# Patient Record
Sex: MALE | Race: WHITE | NOT HISPANIC OR LATINO | Employment: FULL TIME | URBAN - METROPOLITAN AREA
[De-identification: names, ages, dates, MRNs, and addresses within clinical notes are randomized per-mention and may not be internally consistent; named-entity substitution may affect disease eponyms.]

---

## 2017-07-23 ENCOUNTER — NURSE TRIAGE (OUTPATIENT)
Dept: NURSING | Facility: CLINIC | Age: 32
End: 2017-07-23

## 2017-07-23 NOTE — TELEPHONE ENCOUNTER
"  Reason for Disposition    MODERATE rectal bleeding (small blood clots, passing blood without stool, or toilet water turns red)    Additional Information    Negative: Shock suspected (e.g., cold/pale/clammy skin, too weak to stand, low BP, rapid pulse)    Negative: Difficult to awaken or acting confused  (e.g., disoriented, slurred speech)    Negative: Passed out (i.e., lost consciousness, collapsed and was not responding)    Negative: [1] Vomiting AND [2] contains red blood or black (\"coffee ground\") material  (Exception: few red streaks in vomit that only happened once)    Negative: Sounds like a life-threatening emergency to the triager    Negative: Diarrhea is main symptom    Negative: Stool color other than brown or tan is main concern  (no bleeding and no melena)    Negative: SEVERE rectal bleeding (large blood clots; on and off, or constant bleeding)    Negative: SEVERE dizziness (e.g., unable to stand, requires support to walk, feels like passing out now)    Negative: [1] MODERATE rectal bleeding (small blood clots, passing blood without stool, or toilet water turns red) AND [2] more than once a day    Negative: Pale skin (pallor) of new onset or worsening    Negative: Tarry or jet black-colored stool (not dark green)    Negative: [1] Constant abdominal pain AND [2] present > 2 hours    Negative: Rectal foreign body (i.e., now or within past week;  inserted or swallowed)    Negative: High-risk adult (e.g., prior surgery on aorta, abdominal aortic aneurysm)    Negative: Taking Coumadin (warfarin) or other strong blood thinner, or known bleeding disorder (e.g., thrombocytopenia)    Negative: Known cirrhosis of the liver (or history of liver failure or ascites)    Negative: [1] Colonoscopy AND [2] in past 72 hours    Negative: Patient sounds very sick or weak to the triager    Protocols used: RECTAL BLEEDING-ADULT-AH  Patient states he has had blood in his stool X 2 this date; unable to determine amount.  " Transferred to scheduling.

## 2017-07-24 ENCOUNTER — OFFICE VISIT (OUTPATIENT)
Dept: FAMILY MEDICINE | Facility: CLINIC | Age: 32
End: 2017-07-24

## 2017-07-24 ENCOUNTER — TELEPHONE (OUTPATIENT)
Dept: FAMILY MEDICINE | Facility: CLINIC | Age: 32
End: 2017-07-24

## 2017-07-24 VITALS
SYSTOLIC BLOOD PRESSURE: 128 MMHG | TEMPERATURE: 98.3 F | BODY MASS INDEX: 26.51 KG/M2 | HEART RATE: 66 BPM | RESPIRATION RATE: 18 BRPM | DIASTOLIC BLOOD PRESSURE: 76 MMHG | HEIGHT: 73 IN | OXYGEN SATURATION: 100 % | WEIGHT: 200 LBS

## 2017-07-24 DIAGNOSIS — M25.531 PAIN IN BOTH WRISTS: ICD-10-CM

## 2017-07-24 DIAGNOSIS — K62.5 RECTAL BLEEDING: Primary | ICD-10-CM

## 2017-07-24 DIAGNOSIS — K64.9 HEMORRHOIDS, UNSPECIFIED HEMORRHOID TYPE: ICD-10-CM

## 2017-07-24 DIAGNOSIS — M25.532 PAIN IN BOTH WRISTS: ICD-10-CM

## 2017-07-24 LAB
% GRANULOCYTES: 64.8 %G (ref 40–75)
CRP SERPL-MCNC: <2.9 MG/L (ref 0–8)
ERYTHROCYTE [SEDIMENTATION RATE] IN BLOOD: 1 MM/HR (ref 0–20)
GRANULOCYTES #: 3.8 K/UL (ref 1.6–8.3)
HCT VFR BLD AUTO: 43.7 % (ref 40–53)
HEMOGLOBIN: 14 G/DL (ref 13.3–17.7)
LYMPHOCYTES # BLD AUTO: 1.8 K/UL (ref 0.8–5.3)
LYMPHOCYTES NFR BLD AUTO: 29.9 %L (ref 20–48)
MCH RBC QN AUTO: 31.5 PG (ref 26.5–35)
MCHC RBC AUTO-ENTMCNC: 32 G/DL (ref 32–36)
MCV RBC AUTO: 98.3 FL (ref 78–100)
MID #: 0.3 K/UL (ref 0–2.2)
MID %: 5.3 %M (ref 0–20)
PLATELET # BLD AUTO: 244 K/UL (ref 150–450)
RBC # BLD AUTO: 4.45 M/UL (ref 4.4–5.9)
WBC # BLD AUTO: 5.9 K/UL (ref 4–11)

## 2017-07-24 NOTE — MR AVS SNAPSHOT
After Visit Summary   7/24/2017    Donte Mixon    MRN: [id number]           Patient Information     Date Of Birth          1985        Visit Information        Provider Department      7/24/2017 11:20 AM Papo Ritter MD [facility]        Today's Diagnoses     Rectal bleeding    -  1    Hemorrhoids, unspecified hemorrhoid type        Pain in both wrists          Care Instructions    1. Rectal bleeding - likely due to internal hemorrhoids  Use proctofoam twice a day for one week  If you have a repeat significant bleed, we will do a colonoscopy    - ANOSCOPY  - CBC with Diff Plt ([facility])  - CRP inflammation  - Erythrocyte Sedimentation Rate (LabDAQ)    - hydrocortisone-pramoxine (PROCTOFOAM-HC) rectal foam; Place 1 applicator rectally 2 times daily  Dispense: 10 g; Refill: 1      2. Pain in both wrists  Probably due to ergonomics / overuse.  Trial of wrist splint at night on L wrist  Good ergonomics at work  If not improving in 1 month, will do xrays and have you see sports medicine    - EXTENSOR I WRIST EDMOND L LT            Follow-ups after your visit        Who to contact     Please call your clinic at [phone] to:    Ask questions about your health    Make or cancel appointments    Discuss your medicines    Learn about your test results    Speak to your doctor   If you have compliments or concerns about an experience at your clinic, or if you wish to file a complaint, please contact [facility] Patient Relations at [phone] or email us at [email or web address]         Additional Information About Your Visit        MyChart Information     [email or web address] gives you secure access to your electronic health record. If you see a primary care provider, you can also send messages to your care team and make appointments. If you have questions, please call your primary care clinic.  If you do not have a primary care provider, please call  "[phone] and they will assist you.      [employer] is an electronic gateway that provides easy, online access to your medical records. With [employer], you can request a clinic appointment, read your test results, renew a prescription or communicate with your care team.     To access your existing account, please contact your [facility] or call [phone] for assistance.        Care EveryWhere ID     This is your Care EveryWhere ID. This could be used by other organizations to access your [facility] medical records  [id number]        Your Vitals Were     Pulse Temperature Respirations Height Pulse Oximetry BMI (Body Mass Index)    66 98.3  F (36.8  C) (Oral) 18 6' 1\" (185.4 cm) 100% 26.39 kg/m2       Blood Pressure from Last 3 Encounters:   07/24/17 128/76   07/08/16 127/73   05/06/16 125/79    Weight from Last 3 Encounters:   07/24/17 200 lb (90.7 kg)   07/08/16 195 lb (88.5 kg)   05/06/16 195 lb (88.5 kg)              We Performed the Following     ANOSCOPY     CBC with Diff Plt ([facility])     CRP inflammation     Erythrocyte Sedimentation Rate (LabDAQ)     EXTENSOR I WRIST EDMOND L LT          Today's Medication Changes          These changes are accurate as of: 7/24/17  3:32 PM.  If you have any questions, ask your nurse or doctor.               Start taking these medicines.        Dose/Directions    hydrocortisone-pramoxine rectal foam   Commonly known as:  PROCTOFOAM-HC   Used for:  Hemorrhoids, unspecified hemorrhoid type   Started by:  Papo Ritter MD        Dose:  1 applicator   Place 1 applicator rectally 2 times daily   Quantity:  10 g   Refills:  1            Where to get your medicines      These medications were sent to [id number]  [address]  [address] & [place] [address] [place] [address]     Phone:  [phone]     hydrocortisone-pramoxine rectal foam                Primary Care Provider Office Phone # Fax # "    Papo Ritter [phone]       [facility] [address] [address]        Equal Access to Services     RASHAD FERRARI : Hadii aad ku hadnela Lopez, nehemias dashawnqana cristina, kennyta kaalmada kanika, comfort quijanoalexy medranorima caceres keisha vizcarra. So [facility] [phone].    ATENCIÓN: Si habla español, tiene a ferreira disposición servicios gratuitos de asistencia lingüística. Llame al [phone].    We comply with applicable federal civil rights laws and [place] laws. We do not discriminate on the basis of race, color, national origin, age, disability sex, sexual orientation or gender identity.            Thank you!     Thank you for choosing [facility]  for your care. Our goal is always to provide you with excellent care. Hearing back from our patients is one way we can continue to improve our services. Please take a few minutes to complete the written survey that you may receive in the mail after your visit with us. Thank you!             Your Updated Medication List - Protect others around you: Learn how to safely use, store and throw away your medicines at www.disposemymeds.org.          This list is accurate as of: 7/24/17  3:32 PM.  Always use your most recent med list.                   Brand Name Dispense Instructions for use Diagnosis    ALEVE PO      Take 220 mg by mouth 2 times daily as needed for moderate pain or other (Pain)        hydrocortisone-pramoxine rectal foam    PROCTOFOAM-HC    10 g    Place 1 applicator rectally 2 times daily    Hemorrhoids, unspecified hemorrhoid type       VITAMIN B 12 PO      Take by mouth daily        ZINC PO      Take by mouth daily

## 2017-07-24 NOTE — PROGRESS NOTES
"HPI  31 year old male here for evaluation of several issues.    1. Rectal bleed  Had large rectal bleed yesterday  Large clot photographed  No abd pain, rectal pain  No fever, chills, sweats, wt loss, nausea, vomiting, gas, food intolerance, chest pain, breathing issues  No h/o rectal bleed, hemorrhoids  + Fhx colon cancer, diverticulitis  - Fhx IBD    2. Hands/wrist pain  Has pain at lateral epicondyle for some time  Now with achiness in wrists L>R  Achiness in finger joints bilateally  No sharp stabbing pain in hands    Works on keyboard all day  Ergonomics poor per manager. Changed it recently.    3. H/o testiclar pain  Improved  Off meds        ROS  6 point ROS neg other than the symptoms noted above in the HPI.    MEDS  Current Outpatient Prescriptions   Medication     Naproxen Sodium (ALEVE PO)     alfuzosin (UROXATRAL) 10 MG 24 hr tablet     ciprofloxacin (CIPRO) 500 MG tablet     Cyanocobalamin (VITAMIN B 12 PO)     Multiple Vitamins-Minerals (ZINC PO)     No current facility-administered medications for this visit.          SOC      FHx  Family History   Problem Relation Age of Onset     Coronary Artery Disease Mother      Hypertension Mother      Hyperlipidemia Mother      CEREBROVASCULAR DISEASE Mother      Colon Cancer Maternal Grandmother      Other Cancer Paternal Grandmother      ? type     DIABETES Paternal Grandmother      GASTROINTESTINAL DISEASE Maternal Grandfather      diverticulitis       PHYSICAL EXAMINATION:  Vital signs: /76  Pulse 66  Temp 98.3  F (36.8  C) (Oral)  Resp 18  Ht 6' 1\" (185.4 cm)  Wt 200 lb (90.7 kg)  SpO2 100%  BMI 26.39 kg/m2    GENERAL:: healthy, alert and no distress  RESP: lungs clear to auscultation - no rales, no rhonchi, no wheezes  CV: regular rates and rhythm, normal S1 S2, no S3 or S4 and no murmur, no click or rub -  ABDOMEN: soft, no tenderness, no  hepatosplenomegaly, no masses, normal bowel sounds  ANOSCOPY: angry internal hemorrhoids at 4-5 " o'clock      LABS  Results for orders placed or performed in visit on 07/24/17   CBC with Diff Plt ([facility])   Result Value Ref Range    WBC 5.9 4.0 - 11.0 K/uL    Lymphocytes # 1.8 0.8 - 5.3 K/uL    % Lymphocytes 29.9 20.0 - 48.0 %L    Mid # 0.3 0.0 - 2.2 K/uL    Mid % 5.3 0.0 - 20.0 %M    GRANULOCYTES # 3.8 1.6 - 8.3 K/uL    % Granulocytes 64.8 40.0 - 75.0 %G    RBC 4.45 4.40 - 5.90 M/uL    Hemoglobin 14.0 13.3 - 17.7 g/dL    Hematocrit 43.7 40.0 - 53.0 %    MCV 98.3 78.0 - 100.0 fL    MCH 31.5 26.5 - 35.0 pg    MCHC 32.0 32.0 - 36.0 g/dL    Platelets 244.0 150.0 - 450.0 K/uL   CRP inflammation   Result Value Ref Range    CRP Inflammation <2.9 0.0 - 8.0 mg/L   Erythrocyte Sedimentation Rate (LabDAQ)   Result Value Ref Range    Sed Rate 1 0 - 20 mm/hr           ASSESSMENT/PLAN    1. Rectal bleeding  2. Hemorrhoids, unspecified hemorrhoid type  Significant rectal bleed yesterday.  Likely d/t angry internal hemorrhoids   No sxs or labs to suggest Inflammatory bowel disease, cancer, other.    Will treat as if hemorrhoids. If recurs, colonoscopy    - ANOSCOPY  - CBC with Diff Plt ([facility])  - CRP inflammation  - Erythrocyte Sedimentation Rate (LabDAQ)  - hydrocortisone-pramoxine (PROCTOFOAM-HC) rectal foam; Place 1 applicator rectally 2 times daily  Dispense: 10 g; Refill: 1    3. Pain in both wrists  Possible ergonomics of keyboarding / overuse  Trial of L wrist splint at night, good ergonomics during day.  Check CRP, ESR.   If no response, sports med in 1 month.    - EXTENSOR I WRIST EDMOND L LT    4. RTC 1 month  Check wrist/hands - if no improvement, sports med, xrays  Check rectal bleeding - if more bleeding, do colonoscopy      JOHN GOFF

## 2017-07-24 NOTE — PATIENT INSTRUCTIONS
1. Rectal bleeding - likely due to internal hemorrhoids  Use proctofoam twice a day for one week  If you have a repeat significant bleed, we will do a colonoscopy    - ANOSCOPY  - CBC with Diff Plt ([facility])  - CRP inflammation  - Erythrocyte Sedimentation Rate (LabDAQ)    - hydrocortisone-pramoxine (PROCTOFOAM-HC) rectal foam; Place 1 applicator rectally 2 times daily  Dispense: 10 g; Refill: 1      2. Pain in both wrists  Probably due to ergonomics / overuse.  Trial of wrist splint at night on L wrist  Good ergonomics at work  If not improving in 1 month, will do xrays and have you see sports medicine    - EXTENSOR I WRIST FELI CHAVEZ LT

## 2017-07-24 NOTE — TELEPHONE ENCOUNTER
The patient called back to advise that he answered no to family history of colon cancer, and in fact is maternal grandmother did have this disease.  He also wanted us to know that his maternal grandfather had diverticulitis.  Please make the update as necessary to the chart Health and history.

## 2017-07-24 NOTE — LETTER
July 24, 2017      Donte Mixon  [address] [address]        Dear Donte,    Thank you for getting your care at [facility]. Please see below for your test results.  In case you could not conjure up your MyChart password....    All the labs for the rectal bleeding and for arthritis were normal  Good news!  Thanks for the update on your family history      Resulted Orders   CBC with Diff Plt (\[facility]\"")   Result Value Ref Range    WBC 5.9 4.0 - 11.0 K/uL    Lymphocytes # 1.8 0.8 - 5.3 K/uL    % Lymphocytes 29.9 20.0 - 48.0 %L    Mid # 0.3 0.0 - 2.2 K/uL    Mid % 5.3 0.0 - 20.0 %M    GRANULOCYTES # 3.8 1.6 - 8.3 K/uL    % Granulocytes 64.8 40.0 - 75.0 %G    RBC 4.45 4.40 - 5.90 M/uL    Hemoglobin 14.0 13.3 - 17.7 g/dL    Hematocrit 43.7 40.0 - 53.0 %    MCV 98.3 78.0 - 100.0 fL    MCH 31.5 26.5 - 35.0 pg    MCHC 32.0 32.0 - 36.0 g/dL    Platelets 244.0 150.0 - 450.0 K/uL   CRP inflammation   Result Value Ref Range    CRP Inflammation <2.9 0.0 - 8.0 mg/L   Erythrocyte Sedimentation Rate (LabDAQ)   Result Value Ref Range    Sed Rate 1 0 - 20 mm/hr     Sincerely,    Papo Ritter MD

## 2017-07-25 ENCOUNTER — MYC MEDICAL ADVICE (OUTPATIENT)
Dept: FAMILY MEDICINE | Facility: CLINIC | Age: 32
End: 2017-07-25

## 2017-07-25 ENCOUNTER — TELEPHONE (OUTPATIENT)
Dept: FAMILY MEDICINE | Facility: CLINIC | Age: 32
End: 2017-07-25

## 2017-07-25 DIAGNOSIS — K64.9 HEMORRHOIDS, UNSPECIFIED HEMORRHOID TYPE: Primary | ICD-10-CM

## 2017-07-25 DIAGNOSIS — J34.2 DEVIATED NASAL SEPTUM: Primary | ICD-10-CM

## 2017-08-02 ENCOUNTER — TELEPHONE (OUTPATIENT)
Dept: FAMILY MEDICINE | Facility: CLINIC | Age: 32
End: 2017-08-02

## 2017-08-02 NOTE — TELEPHONE ENCOUNTER
PA started 08/02/2017 hydrocortisone (ANUSOL-HC) 2.5 % cream      covermymeds key: [id number]    [facility] pharmacy fax:[phone]    Olimpia Geller CMA

## 2017-09-25 ENCOUNTER — MYC MEDICAL ADVICE (OUTPATIENT)
Dept: UROLOGY | Facility: CLINIC | Age: 32
End: 2017-09-25

## 2017-09-25 ENCOUNTER — PRE VISIT (OUTPATIENT)
Dept: OTOLARYNGOLOGY | Facility: CLINIC | Age: 32
End: 2017-09-25

## 2017-09-25 NOTE — TELEPHONE ENCOUNTER
Stephen,     That's a great question. I saw a doctor years ago when living in [facility], though it wasn't septum specific, rather ENT. I don't remember the providers name though. Do you have any suggestions on how to obtain?     Thanks,     Donte

## 2017-09-25 NOTE — TELEPHONE ENCOUNTER
1.  Date/reason for appt: 9/29/17 - deviated nasal septum    2.  Referring provider: [facility] Dr. Ritter    3.  Call to patient (Yes / No - short description): yes, email    4.  Previous care at:    Aashish Platt MD ([place])

## 2017-09-26 NOTE — TELEPHONE ENCOUNTER
Dr. Aashish Platt - [phone]. Saw him in 2011.    Thanks,     Donte   _ _ _ _     Emailed IVA to patient.

## 2017-09-28 NOTE — TELEPHONE ENCOUNTER
Called Dr. Aashish Sanford's office.  who answered said I'd have to call back on Monday, there's no one in the office to release records until Monday.

## 2017-09-29 ENCOUNTER — OFFICE VISIT (OUTPATIENT)
Dept: OTOLARYNGOLOGY | Facility: CLINIC | Age: 32
End: 2017-09-29

## 2017-09-29 VITALS — WEIGHT: 200 LBS | BODY MASS INDEX: 26.51 KG/M2 | HEIGHT: 73 IN

## 2017-09-29 DIAGNOSIS — R09.A2 GLOBUS SENSATION: ICD-10-CM

## 2017-09-29 DIAGNOSIS — R09.81 NASAL CONGESTION: Primary | ICD-10-CM

## 2017-09-29 RX ORDER — FLUTICASONE PROPIONATE 50 MCG
2 SPRAY, SUSPENSION (ML) NASAL DAILY
Qty: 1 BOTTLE | Refills: 6 | Status: SHIPPED | OUTPATIENT
Start: 2017-09-29 | End: 2018-07-18

## 2017-09-29 ASSESSMENT — PAIN SCALES - GENERAL: PAINLEVEL: NO PAIN (0)

## 2017-09-29 NOTE — PROGRESS NOTES
Otolaryngology Adult Consultation    Patient: Dotne Mixon  : 1985      HPI:  Donte Mixon is a 31 year old male seen today in the Otolaryngology Clinic for devaited septum.  The patient reports that he has a difficult time breathing out of the left side of his nose.  The difficulty ranges from mild to severe at times.  He was seen by an ENT in [place] and was told that he had a deviated septum.  He denies any history of nasal trauma.  He does feel like his nostrils are asymmetric and that the left one is way smaller than the right one.  He has not been on any nasal sprays in terms of a nasal steroid spray.  He has used Afrin at times which does seem to help.  He denies any issues with sinusitis.     In addition, the patient reports in the last several weeks he feels like there has been a globus sensation in the back of his throat.  He feels it with swallows.  It is not painful.  He has never not been able to swallow food, but he feels like there is something that gets stuck back there.         Medications:  Current Outpatient Rx   Medication Sig Dispense Refill     fluticasone (FLONASE) 50 MCG/ACT spray Spray 2 sprays into both nostrils daily 1 Bottle 6     hydrocortisone (ANUSOL-HC) 2.5 % cream Place rectally 2 times daily 30 g 1     Naproxen Sodium (ALEVE PO) Take 220 mg by mouth 2 times daily as needed for moderate pain or other (Pain)       hydrocortisone-pramoxine (PROCTOFOAM-HC) rectal foam Place 1 applicator rectally 2 times daily 10 g 1     Cyanocobalamin (VITAMIN B 12 PO) Take by mouth daily       Multiple Vitamins-Minerals (ZINC PO) Take by mouth daily         Allergies: Sulfa drugs     PMH:  Past Medical History:   Diagnosis Date     Hoarseness     Off and on over the years.       PSH:  No past surgical history on file.    FH:  Family History   Problem Relation Age of Onset     Coronary Artery Disease Mother      Hypertension Mother      Hyperlipidemia Mother      CEREBROVASCULAR DISEASE Mother       Migraines Mother      Colon Cancer Maternal Grandmother      Other Cancer Paternal Grandmother      ? type     DIABETES Paternal Grandmother      GASTROINTESTINAL DISEASE Maternal Grandfather      diverticulitis        SH:  Social History   Substance Use Topics     Smoking status: Never Smoker     Smokeless tobacco: Never Used     Alcohol use 4.8 oz/week       Review of Systems   ENT ROS 9/26/2017   Ears, Nose, Throat Nasal congestion or drainage, Sore throat, Trouble swallowing, Hoarseness       Physical Exam:    GEN:  The patient is alert, oriented and in no acute distress.  HEAD:  Head, face scalp is grossly normal.  EARS:  Ears demonstrate normal canals, auricles and tympanic membranes.  NOSE: Generally, the patient does have a straight upper two-thirds of the nose.  The lower third does have a subtle curve to the right.  When looking at the base of his nose, the caudal end of the septum is flipped out on the left side.  Intranasally, the septum is a little bit more shifted over to the left versus the right.  He does have a septal spur on the right side.  He does have enlarged turbinates.  He did not have a significant response to Afrin, but the Afrin also did not seem to decongest his nose a significant amount.         ORAL:  Oral cavity shows healthy mucosa with out ulceration, masses or other lesions                involving the tongue, palate, buccal mucosa, floor of mouth or gingiva.               NECK:   Neck is without adenopathy, thyroid or salivary gland masses.  PUL: normal work of breathing; no stridor  CV: RRR; no peripheral edema  M/S: normal muscle tone     Laryngoscopy is performed to examine the upper airway for pathology, functional or anatomic abnormality.  After application of topical anesthetic/decongestant solution the flexible endoscope was passed into each nasal cavity separately.  Findings are as follows:   Nasal passages without abnormality.     Nasopharynx:  Clear, no lesions,  crusting or inflammation   Base of tongue, vallecula, epiglottis, aryepiglottic folds, false vocal folds without mucosal lesions, masses or anatomic abnormality.   Glottis with normal movement of vocal folds, normal mucosa and no lesions   Pyriform sinuses, post-cricoid area, and posterior pharyngeal wall without lesions    Assessment/Plan:  The patient presents with a deviated septum and difficulty breathing from the left side as well as a globus sensation.  I reassured the patient that I did not see any masses or lesions on his laryngoscopy today.  I think that the globus sensation is likely due to some tension or swelling.  He does have a little bit of postnasal drip present.  Therefore, I am going to start him on Flonase to help with the postnasal drip and to also see if that increases his left-sided nasal breathing.  I am also going to check with our facial plastic surgeon because he does have a degree of caudal show of his septum.  This is not something that I have a great deal of experience with correcting and therefore, I would like to see if this is something that she is comfortable dealing with.  In the meantime, I will have the patient come back and see me in 4-6 weeks.  If it is more appropriate for him to see our facial plastic surgeon, I will certainly call him and make that recommendation.     I spent a total of 35 minutes face-to-face with Donte Mixon during today's office visit.  Over 50% of this time was spent counseling the patient on and/or coordinating care as documented in my assessment and plan.

## 2017-09-29 NOTE — PATIENT INSTRUCTIONS
1.  You were seen in the ENT Clinic today by Dr. Simon.  If you have any questions or concerns after your appointment, please call [phone].  Press option #1 for scheduling related needs.  Press option #3 for Nurse advice.  2.  Plan is to return to clinic in 4-6 weeks for another assessment.  3. Please use Flonase as directed. This was sent to your local pharmacy.

## 2017-09-29 NOTE — NURSING NOTE
Chief Complaint   Patient presents with     Consult     deviated nasal septum, sore throat, post nasal drip     Moe Padilla LPN

## 2017-09-29 NOTE — LETTER
2017       RE: Donte Mixon  [address] [facility] [address]     Dear Colleague,    Thank you for referring your patient, Donte Mixon, to the [facility] EAR NOSE AND THROAT at [facility]. Please see a copy of my visit note below.    Otolaryngology Adult Consultation    Patient: Donte Mixon  : 1985      HPI:  Donte Mixon is a 31 year old male seen today in the Otolaryngology Clinic for devaited septum.  The patient reports that he has a difficult time breathing out of the left side of his nose.  The difficulty ranges from mild to severe at times.  He was seen by an ENT in [place] and was told that he had a deviated septum.  He denies any history of nasal trauma.  He does feel like his nostrils are asymmetric and that the left one is way smaller than the right one.  He has not been on any nasal sprays in terms of a nasal steroid spray.  He has used Afrin at times which does seem to help.  He denies any issues with sinusitis.     In addition, the patient reports in the last several weeks he feels like there has been a globus sensation in the back of his throat.  He feels it with swallows.  It is not painful.  He has never not been able to swallow food, but he feels like there is something that gets stuck back there.         Medications:  Current Outpatient Rx   Medication Sig Dispense Refill     fluticasone (FLONASE) 50 MCG/ACT spray Spray 2 sprays into both nostrils daily 1 Bottle 6     hydrocortisone (ANUSOL-HC) 2.5 % cream Place rectally 2 times daily 30 g 1     Naproxen Sodium (ALEVE PO) Take 220 mg by mouth 2 times daily as needed for moderate pain or other (Pain)       hydrocortisone-pramoxine (PROCTOFOAM-HC) rectal foam Place 1 applicator rectally 2 times daily 10 g 1     Cyanocobalamin (VITAMIN B 12 PO) Take by mouth daily       Multiple Vitamins-Minerals (ZINC PO) Take by mouth daily         Allergies: Sulfa drugs     PMH:  Past Medical History:    Diagnosis Date     Hoarseness     Off and on over the years.       PSH:  No past surgical history on file.    FH:  Family History   Problem Relation Age of Onset     Coronary Artery Disease Mother      Hypertension Mother      Hyperlipidemia Mother      CEREBROVASCULAR DISEASE Mother      Migraines Mother      Colon Cancer Maternal Grandmother      Other Cancer Paternal Grandmother      ? type     DIABETES Paternal Grandmother      GASTROINTESTINAL DISEASE Maternal Grandfather      diverticulitis        SH:  Social History   Substance Use Topics     Smoking status: Never Smoker     Smokeless tobacco: Never Used     Alcohol use 4.8 oz/week       Review of Systems   ENT ROS 9/26/2017   Ears, Nose, Throat Nasal congestion or drainage, Sore throat, Trouble swallowing, Hoarseness       Physical Exam:    GEN:  The patient is alert, oriented and in no acute distress.  HEAD:  Head, face scalp is grossly normal.  EARS:  Ears demonstrate normal canals, auricles and tympanic membranes.  NOSE: Generally, the patient does have a straight upper two-thirds of the nose.  The lower third does have a subtle curve to the right.  When looking at the base of his nose, the caudal end of the septum is flipped out on the left side.  Intranasally, the septum is a little bit more shifted over to the left versus the right.  He does have a septal spur on the right side.  He does have enlarged turbinates.  He did not have a significant response to Afrin, but the Afrin also did not seem to decongest his nose a significant amount.         ORAL:  Oral cavity shows healthy mucosa with out ulceration, masses or other lesions                involving the tongue, palate, buccal mucosa, floor of mouth or gingiva.               NECK:   Neck is without adenopathy, thyroid or salivary gland masses.  PUL: normal work of breathing; no stridor  CV: RRR; no peripheral edema  M/S: normal muscle tone     Laryngoscopy is performed to examine the upper airway  for pathology, functional or anatomic abnormality.  After application of topical anesthetic/decongestant solution the flexible endoscope was passed into each nasal cavity separately.  Findings are as follows:   Nasal passages without abnormality.     Nasopharynx:  Clear, no lesions, crusting or inflammation   Base of tongue, vallecula, epiglottis, aryepiglottic folds, false vocal folds without mucosal lesions, masses or anatomic abnormality.   Glottis with normal movement of vocal folds, normal mucosa and no lesions   Pyriform sinuses, post-cricoid area, and posterior pharyngeal wall without lesions    Assessment/Plan:  The patient presents with a deviated septum and difficulty breathing from the left side as well as a globus sensation.  I reassured the patient that I did not see any masses or lesions on his laryngoscopy today.  I think that the globus sensation is likely due to some tension or swelling.  He does have a little bit of postnasal drip present.  Therefore, I am going to start him on Flonase to help with the postnasal drip and to also see if that increases his left-sided nasal breathing.  I am also going to check with our facial plastic surgeon because he does have a degree of caudal show of his septum.  This is not something that I have a great deal of experience with correcting and therefore, I would like to see if this is something that she is comfortable dealing with.  In the meantime, I will have the patient come back and see me in 4-6 weeks.  If it is more appropriate for him to see our facial plastic surgeon, I will certainly call him and make that recommendation.     I spent a total of 35 minutes face-to-face with Donte Mixon during today's office visit.  Over 50% of this time was spent counseling the patient on and/or coordinating care as documented in my assessment and plan.        Again, thank you for allowing me to participate in the care of your patient.      Sincerely,    Ramya Simon,  MD

## 2017-09-29 NOTE — MR AVS SNAPSHOT
After Visit Summary   9/29/2017    Donte Mixon    MRN: [id number]           Patient Information     Date Of Birth          1985        Visit Information        Provider Department      9/29/2017 2:00 PM Ramya Simon MD M [facility] Ear Nose and Throat        Today's Diagnoses     Nasal congestion    -  1    Globus sensation          Care Instructions    1.  You were seen in the ENT Clinic today by Dr. Simon.  If you have any questions or concerns after your appointment, please call [phone].  Press option #1 for scheduling related needs.  Press option #3 for Nurse advice.  2.  Plan is to return to clinic in 4-6 weeks for another assessment.  3. Please use Flonase as directed. This was sent to your local pharmacy.              Follow-ups after your visit        Your next 10 appointments already scheduled     Nov 02, 2017  9:45 AM CDT   (Arrive by 9:30 AM)   Return Visit with MD MIRIAM Dahl [facility] Ear Nose and Throat ([facility])    [address] [place] [address] [phone]              Who to contact     Please call your clinic at [phone] to:    Ask questions about your health    Make or cancel appointments    Discuss your medicines    Learn about your test results    Speak to your doctor   If you have compliments or concerns about an experience at your clinic, or if you wish to file a complaint, please contact [facility] Patient Relations at [phone] or email us at [email or web address]         Additional Information About Your Visit        MyChart Information     [employer] gives you secure access to your electronic health record. If you see a primary care provider, you can also send messages to your care team and make appointments. If you have questions, please call your primary care clinic.  If you do not have a primary care provider, please call [phone] and they  "will assist you.      [employer] is an electronic gateway that provides easy, online access to your medical records. With [employer], you can request a clinic appointment, read your test results, renew a prescription or communicate with your care team.     To access your existing account, please contact your [facility] or call [phone] for assistance.        Care EveryWhere ID     This is your Care EveryWhere ID. This could be used by other organizations to access your [place] medical records  [id number]        Your Vitals Were     Height BMI (Body Mass Index)                1.86 m (6' 1.23\") 26.22 kg/m2           Blood Pressure from Last 3 Encounters:   07/24/17 128/76   07/08/16 127/73   05/06/16 125/79    Weight from Last 3 Encounters:   09/29/17 90.7 kg (200 lb)   07/24/17 90.7 kg (200 lb)   07/08/16 88.5 kg (195 lb)              We Performed the Following     LARYNGOSCOPY FLEX FIBEROPTIC, DIAGNOSTIC          Today's Medication Changes          These changes are accurate as of: 9/29/17 11:59 PM.  If you have any questions, ask your nurse or doctor.               Start taking these medicines.        Dose/Directions    fluticasone 50 MCG/ACT spray   Commonly known as:  FLONASE   Used for:  Nasal congestion   Started by:  Ramya Simon MD        Dose:  2 spray   Spray 2 sprays into both nostrils daily   Quantity:  1 Bottle   Refills:  6            Where to get your medicines      These medications were sent to [facility] [address] [place] [address] [address] [place] [address] [place] [address]     Phone:  [phone]     fluticasone 50 MCG/ACT spray                Primary Care Provider Office Phone # Fax #    Papo Ritter [phone]       [address] [address]        Equal Access to Services     RASHAD FERRARI AH: Hadii delaney Lopez, waaxda luqadago, qaybta kaalmada " comfort boudreauxrima whippleaan ah. So [facility] [phone].    ATENCIÓN: Si habla heriberto, tiene a ferreira disposición servicios gratuitos de asistencia lingüística. Madeline al [phone].    We comply with applicable federal civil rights laws and [place] laws. We do not discriminate on the basis of race, color, national origin, age, disability, sex, sexual orientation, or gender identity.            Thank you!     Thank you for choosing [facility] EAR NOSE AND THROAT  for your care. Our goal is always to provide you with excellent care. Hearing back from our patients is one way we can continue to improve our services. Please take a few minutes to complete the written survey that you may receive in the mail after your visit with us. Thank you!             Your Updated Medication List - Protect others around you: Learn how to safely use, store and throw away your medicines at www.disposemymeds.org.          This list is accurate as of: 9/29/17 11:59 PM.  Always use your most recent med list.                   Brand Name Dispense Instructions for use Diagnosis    ALEVE PO      Take 220 mg by mouth 2 times daily as needed for moderate pain or other (Pain)        fluticasone 50 MCG/ACT spray    FLONASE    1 Bottle    Spray 2 sprays into both nostrils daily    Nasal congestion       hydrocortisone 2.5 % cream    ANUSOL-HC    30 g    Place rectally 2 times daily    Hemorrhoids, unspecified hemorrhoid type       hydrocortisone-pramoxine rectal foam    PROCTOFOAM-HC    10 g    Place 1 applicator rectally 2 times daily    Hemorrhoids, unspecified hemorrhoid type       VITAMIN B 12 PO      Take by mouth daily        ZINC PO      Take by mouth daily

## 2017-12-07 NOTE — TELEPHONE ENCOUNTER
APPT INFO    Date /Time: 12/20/17 at 4PM   Reason for Appt: Septal Deviation   Ref Provider/Clinic: Ramya Simon   Are there internal records? If yes, list: [facility]   Patient Contact (Y/N) & Call Details: No, referred   Action:

## 2017-12-20 ENCOUNTER — PRE VISIT (OUTPATIENT)
Dept: OTOLARYNGOLOGY | Facility: CLINIC | Age: 32
End: 2017-12-20

## 2018-03-15 ENCOUNTER — NURSE TRIAGE (OUTPATIENT)
Dept: NURSING | Facility: CLINIC | Age: 33
End: 2018-03-15

## 2018-03-15 ENCOUNTER — OFFICE VISIT (OUTPATIENT)
Dept: FAMILY MEDICINE | Facility: CLINIC | Age: 33
End: 2018-03-15
Payer: COMMERCIAL

## 2018-03-15 VITALS
BODY MASS INDEX: 28 KG/M2 | HEIGHT: 73 IN | HEART RATE: 65 BPM | WEIGHT: 211.3 LBS | DIASTOLIC BLOOD PRESSURE: 75 MMHG | OXYGEN SATURATION: 97 % | TEMPERATURE: 97.6 F | SYSTOLIC BLOOD PRESSURE: 116 MMHG

## 2018-03-15 DIAGNOSIS — J01.91 ACUTE RECURRENT SINUSITIS, UNSPECIFIED LOCATION: ICD-10-CM

## 2018-03-15 DIAGNOSIS — H65.192 OTHER ACUTE NONSUPPURATIVE OTITIS MEDIA OF LEFT EAR, RECURRENCE NOT SPECIFIED: ICD-10-CM

## 2018-03-15 DIAGNOSIS — H92.02 LEFT EAR PAIN: Primary | ICD-10-CM

## 2018-03-15 DIAGNOSIS — J34.2 DEVIATED NASAL SEPTUM: ICD-10-CM

## 2018-03-15 PROCEDURE — 99213 OFFICE O/P EST LOW 20 MIN: CPT | Performed by: INTERNAL MEDICINE

## 2018-03-15 RX ORDER — METHYLPREDNISOLONE 4 MG
1 TABLET, DOSE PACK ORAL DAILY
Refills: 0 | COMMUNITY
Start: 2018-03-13 | End: 2018-05-07

## 2018-03-15 RX ORDER — FLUTICASONE PROPIONATE 50 MCG
2 SPRAY, SUSPENSION (ML) NASAL DAILY
Refills: 6 | COMMUNITY
Start: 2018-03-10 | End: 2018-10-01

## 2018-03-15 RX ORDER — AZITHROMYCIN 250 MG/1
TABLET, FILM COATED ORAL
Qty: 6 TABLET | Refills: 1 | Status: SHIPPED | OUTPATIENT
Start: 2018-03-15 | End: 2018-05-07

## 2018-03-15 RX ORDER — PREDNISONE 20 MG/1
20 TABLET ORAL 2 TIMES DAILY
Qty: 10 TABLET | Refills: 1 | Status: SHIPPED | OUTPATIENT
Start: 2018-03-15 | End: 2018-05-07

## 2018-03-15 NOTE — NURSING NOTE
"Chief Complaint   Patient presents with     Follow Up For     left ear pain     Medication Problem       Face is itching. Patient is taking Prednisone 4mg (methoprednisone).       Initial /75 (BP Location: Left arm, Patient Position: Chair, Cuff Size: Adult Large)  Pulse 65  Temp 97.6  F (36.4  C) (Oral)  Ht 6' 1.25\" (1.861 m)  Wt 211 lb 4.8 oz (95.8 kg)  SpO2 97%  BMI 27.69 kg/m2 Estimated body mass index is 27.69 kg/(m^2) as calculated from the following:    Height as of this encounter: 6' 1.25\" (1.861 m).    Weight as of this encounter: 211 lb 4.8 oz (95.8 kg).  Medication Reconciliation: complete.  Juliana Rodriguez CMA    "

## 2018-03-15 NOTE — MR AVS SNAPSHOT
After Visit Summary   3/15/2018    Donte Mixon    MRN: [id number]           Patient Information     Date Of Birth          1985        Visit Information        Provider Department      3/15/2018 2:30 PM Megan Dergoot MD [facility]        Today's Diagnoses     Left ear pain    -  1    Other acute nonsuppurative otitis media of left ear, recurrence not specified           Follow-ups after your visit        Additional Services     OTOLARYNGOLOGY REFERRAL       Your provider has referred you to: [facility]  [place] [phone]   [email or web address]    Please be aware that coverage of these services is subject to the terms and limitations of your health insurance plan.  Call member services at your health plan with any benefit or coverage questions.      Please bring the following with you to your appointment:    (1) Any X-Rays, CTs or MRIs which have been performed.  Contact the facility where they were done to arrange for  prior to your scheduled appointment.   (2) List of current medications  (3) This referral request   (4) Any documents/labs given to you for this referral                  Your next 10 appointments already scheduled     May 23, 2018  5:20 PM CDT   (Arrive by 5:05 PM)   New Patient Visit with Riana Saunders MD   [facility] Ear Nose and Throat ([facility])    [address] [place] [address] [phone]              Who to contact     If you have questions or need follow up information about today's clinic visit or your schedule please contact [facility] [place] directly at [phone].  Normal or non-critical lab and imaging results will be communicated to you by MyChart, letter or phone within 4 business days after the clinic has received the results. If you do not hear from us within 7 days, please contact the clinic through MyChart or phone. If you have a critical or  "abnormal lab result, we will notify you by phone as soon as possible.  Submit refill requests through [employer] or call your pharmacy and they will forward the refill request to us. Please allow 3 business days for your refill to be completed.          Additional Information About Your Visit        [employer] Information     [employer] gives you secure access to your electronic health record. If you see a primary care provider, you can also send messages to your care team and make appointments. If you have questions, please call your primary care clinic.  If you do not have a primary care provider, please call [phone] and they will assist you.        Care EveryWhere ID     This is your Care EveryWhere ID. This could be used by other organizations to access your [place] medical records  [id number]        Your Vitals Were     Pulse Temperature Height Pulse Oximetry BMI (Body Mass Index)       65 97.6  F (36.4  C) (Oral) 6' 1.25\" (1.861 m) 97% 27.69 kg/m2        Blood Pressure from Last 3 Encounters:   03/15/18 116/75   07/24/17 128/76   07/08/16 127/73    Weight from Last 3 Encounters:   03/15/18 211 lb 4.8 oz (95.8 kg)   09/29/17 200 lb (90.7 kg)   07/24/17 200 lb (90.7 kg)              We Performed the Following     OTOLARYNGOLOGY REFERRAL          Today's Medication Changes          These changes are accurate as of 3/15/18  3:00 PM.  If you have any questions, ask your nurse or doctor.               Start taking these medicines.        Dose/Directions    azithromycin 250 MG tablet   Commonly known as:  ZITHROMAX   Used for:  Left ear pain, Other acute nonsuppurative otitis media of left ear, recurrence not specified   Started by:  Megan Degroot MD        Two tablets first day, then one tablet daily for four days.   Quantity:  6 tablet   Refills:  1       predniSONE 20 MG tablet   Commonly known as:  DELTASONE   Used for:  Left ear pain, Other acute nonsuppurative otitis media of left ear, recurrence not " specified   Started by:  Megan Degroot MD        Dose:  20 mg   Take 1 tablet (20 mg) by mouth 2 times daily   Quantity:  10 tablet   Refills:  1         Stop taking these medicines if you haven't already. Please contact your care team if you have questions.     ALEVE PO   Stopped by:  Megan Degroot MD           hydrocortisone 2.5 % cream   Commonly known as:  ANUSOL-HC   Stopped by:  Megan Degroot MD           hydrocortisone-pramoxine rectal foam   Commonly known as:  PROCTOFOAM-HC   Stopped by:  Megan Degroot MD           ZINC PO   Stopped by:  Megan Degroot MD                Where to get your medicines      These medications were sent to [facility] [address] [place] [address] [address] [place] [address] [address] [facility] [address]     Phone:  [phone]     azithromycin 250 MG tablet    predniSONE 20 MG tablet                Primary Care Provider Office Phone # Fax #    Papo Ritter [phone]       [address] [address]        Equal Access to Services     [facility] Center[facility]IST[facility] : Hadii delaney ku hadasho Soomaali, waaxda luqadaha, qaybta kaalmada adeegyada, comfort vizcarra. So [facility] [phone].    ATENCIÓN: Si habla español, tiene a ferreira disposición servicios gratuitos de asistencia lingüística. [facility] [phone].    We comply with applicable federal civil rights laws and [place] laws. We do not discriminate on the basis of race, color, national origin, age, disability, sex, sexual orientation, or gender identity.            Thank you!     Thank you for choosing [facility]  for your care. Our goal is always to provide you with excellent care. Hearing back from our patients is one way we can continue to improve our services. Please take a few minutes to complete the written survey that you may receive in the mail after your visit with us. Thank you!             Your  Updated Medication List - Protect others around you: Learn how to safely use, store and throw away your medicines at www.disposemymeds.org.          This list is accurate as of 3/15/18  3:00 PM.  Always use your most recent med list.                   Brand Name Dispense Instructions for use Diagnosis    azithromycin 250 MG tablet    ZITHROMAX    6 tablet    Two tablets first day, then one tablet daily for four days.    Left ear pain, Other acute nonsuppurative otitis media of left ear, recurrence not specified       fluticasone 50 MCG/ACT spray    FLONASE     Spray 2 sprays into both nostrils daily        methylPREDNISolone 4 MG tablet    MEDROL DOSEPAK     Take 1 tablet by mouth daily        predniSONE 20 MG tablet    DELTASONE    10 tablet    Take 1 tablet (20 mg) by mouth 2 times daily    Left ear pain, Other acute nonsuppurative otitis media of left ear, recurrence not specified       VITAMIN B 12 PO      Take by mouth daily

## 2018-03-15 NOTE — TELEPHONE ENCOUNTER
Donte did telemedicine through tele doc yesterday.  Donte is congested and has ear pain and prescribed medicine and feels he is having an allergic reaction.  Face is itching.  Medication prescribed is prednisone 4mg 21 tabs (methoprednisone).  Left ear has been hurting.  Donte is going to call Tele doc back first   About medication and then will proceed from there.

## 2018-03-15 NOTE — TELEPHONE ENCOUNTER
Reason for Disposition    Earache  (Exceptions: brief ear pain of < 60 minutes duration, earache occurring during air travel    Additional Information    Negative: Moving the earlobe or touching the ear clearly increases the pain    Negative: Foreign body struck in the ear (e.g., bug, piece of cotton)    Negative: Followed an ear injury    Negative: [1] Recently diagnosed with otitis media AND [2] currently on oral antibiotics    Negative: [1] Stiff neck (unable to touch chin to chest) AND [2] fever    Negative: [1] Bony area of skull behind the ear is pink or swollen AND [2] fever    Negative: Fever > 104 F (40 C)    Negative: Patient sounds very sick or weak to the triager    Negative: [1] SEVERE pain AND [2] not improved 2 hours after taking analgesic medication (e.g., ibuprofen or acetaminophen)    Negative: Walking is very unsteady    Negative: Sudden onset of ear pain after long - thin object was inserted into the ear canal (e.g., pencil, Q-tip)    Negative: Diabetes mellitus or weak immune system (e.g., HIV positive, cancer chemo, splenectomy, organ transplant, chronic steroids)    Negative: Recent onset of blurred vision    Negative: White, yellow, or green discharge    Negative: Bloody discharge or unexplained bleeding from ear canal    Protocols used: EARACHE-ADULT-

## 2018-03-15 NOTE — PROGRESS NOTES
SUBJECTIVE:   Donte Mixon is a 32 year old male who presents to clinic today for the following health issues:      Ear pain - on mucinex, ibuprofen, benedryl, zyrtec, left ear pain.        HPI:   Patient Donte Mixon is a very pleasant 32 year old male with history of chronic sinusitis with deviated nasal septum who presents to Internal Medicine clinic today for evaluation for several days of new recurrent acute sinusitis, ear pain in the left ear with otitis media. Regarding the patient's current syptoms, the patient is interested in an evaluation by outpatient ENT clinic going forward. Patient denies any fever or chills.    Current Medications:     Current Outpatient Prescriptions   Medication Sig Dispense Refill     methylPREDNISolone (MEDROL DOSEPAK) 4 MG tablet Take 1 tablet by mouth daily  0     fluticasone (FLONASE) 50 MCG/ACT spray Spray 2 sprays into both nostrils daily  6     azithromycin (ZITHROMAX) 250 MG tablet Two tablets first day, then one tablet daily for four days. 6 tablet 1     predniSONE (DELTASONE) 20 MG tablet Take 1 tablet (20 mg) by mouth 2 times daily 10 tablet 1     Cyanocobalamin (VITAMIN B 12 PO) Take by mouth daily           Allergies:      Allergies   Allergen Reactions     Sulfa Drugs Hives            Past Medical History:     Past Medical History:   Diagnosis Date     Hoarseness     Off and on over the years.         Past Surgical History:   No past surgical history on file.      Family Medical History:     Family History   Problem Relation Age of Onset     Coronary Artery Disease Mother      Hypertension Mother      Hyperlipidemia Mother      CEREBROVASCULAR DISEASE Mother      Migraines Mother      Colon Cancer Maternal Grandmother      Other Cancer Paternal Grandmother      ? type     DIABETES Paternal Grandmother      GASTROINTESTINAL DISEASE Maternal Grandfather      diverticulitis         Social History:     Social History     Social History     Marital status:       "Spouse name: N/A     Number of children: N/A     Years of education: N/A     Occupational History     Not on file.     Social History Main Topics     Smoking status: Never Smoker     Smokeless tobacco: Never Used     Alcohol use 4.8 oz/week     Drug use: No     Sexual activity: Yes     Partners: Female     Birth control/ protection: Natural Family Planning     Other Topics Concern      Service No     Blood Transfusions No     Caffeine Concern No     2cpd     Occupational Exposure No     Hobby Hazards No     Sleep Concern No     Stress Concern No     Weight Concern No     Special Diet No     Back Care No     Exercise Yes     4 per week crossfit and yoga     Bike Helmet Yes     NA     Seat Belt Yes     Self-Exams Yes     Social History Narrative    Lives with fiance.  Works going well.  Works in insurance.  Feels safe in environment.  Has a good support network.  Moved from [place] a year ago.    Chelsi Ruiz PA-C    11/10/2015               Review of System:     Constitutional: Negative for fever or chills  Skin: Negative for rashes  Ears/Nose/Throat: Positive for nasal congestion, acute sinusitis and ear pain symptoms   Respiratory: No shortness of breath, dyspnea on exertion, cough, or hemoptysis  Cardiovascular: Negative for chest pain  Gastrointestinal: Negative for nausea, vomiting  Genitourinary: Negative for dysuria, hematuria  Musculoskeletal: Negative for myalgias  Neurologic: Negative for headaches  Psychiatric: Negative for depression, anxiety  Hematologic/Lymphatic/Immunologic: Negative  Endocrine: Negative  Behavioral: Negative for tobacco use       Physical Exam:   /75 (BP Location: Left arm, Patient Position: Chair, Cuff Size: Adult Large)  Pulse 65  Temp 97.6  F (36.4  C) (Oral)  Ht 6' 1.25\" (1.861 m)  Wt 211 lb 4.8 oz (95.8 kg)  SpO2 97%  BMI 27.69 kg/m2    GENERAL: healthy, alert and no distress  EYES: eyes grossly normal to inspection, and conjunctivae and sclerae normal  HENT: " Normocephalic atraumatic. Nasal septal deviation present, nasal congestion, left ear otitis media present  NECK: supple  RESP: lungs clear to auscultation   CV: regular rate and rhythm, normal S1 S2  LYMPH: no peripheral edema   ABDOMEN: nondistended  MS: no gross musculoskeletal defects noted  SKIN: no suspicious lesions or rashes  NEURO: Alert & Oriented x 3.   PSYCH: mentation appears normal, affect normal          ASSESSMENT/PLAN:       (H92.02) Left ear pain  (primary encounter diagnosis)  (H65.192) Other acute nonsuppurative otitis media of left ear, recurrence not specified  (J34.2) Deviated nasal septum  (J01.91) Acute recurrent sinusitis, unspecified location  Comment: several days of recurrent acute on chronic sinusitis flare up symptoms with left ear pains concerning for otitis media  Plan: I have ordered OTOLARYNGOLOGY REFERRAL for further evaluation at the [facility] clinic in [place]. In the meantime, I have prescribed azithromycin (ZITHROMAX) 250 MG tablet, predniSONE   (DELTASONE) 20 MG tablet for treatment.      Follow Up Plan:     Patient is instructed to return to Internal Medicine clinic for follow-up visit in 1 month.        Megan Degroot MD  Internal Medicine  [facility]

## 2018-03-19 ENCOUNTER — TELEPHONE (OUTPATIENT)
Dept: FAMILY MEDICINE | Facility: CLINIC | Age: 33
End: 2018-03-19

## 2018-03-19 DIAGNOSIS — J06.9 UPPER RESPIRATORY TRACT INFECTION, UNSPECIFIED TYPE: Primary | ICD-10-CM

## 2018-03-19 RX ORDER — DOXYCYCLINE 100 MG/1
100 CAPSULE ORAL 2 TIMES DAILY
Qty: 14 CAPSULE | Refills: 0 | Status: SHIPPED | OUTPATIENT
Start: 2018-03-19 | End: 2018-03-26

## 2018-03-19 NOTE — TELEPHONE ENCOUNTER
Please notify pt that doxycycline 100mg 2xDay for 1 week prescription has been sent to [facility] electronically.

## 2018-03-19 NOTE — TELEPHONE ENCOUNTER
Reason for call:  Patient reporting a symptom    Symptom or request: Ear Pain    Duration (how long have symptoms been present): about a week     Have you been treated for this before? Yes    Additional comments: Patient has been taking medication and the medication is not helping. Patient states pain is not going away. Also has a flight later in the week and is worried about how the ear will react.     Phone Number patient can be reached at:  Home number on file [phone] (home) [phone]    Best Time:  Anytime     Can we leave a detailed message on this number:  YES    Call taken on 3/19/2018 at 12:12 PM by Josette Arechiga

## 2018-03-19 NOTE — TELEPHONE ENCOUNTER
"OV 3/15/18 with Dr. Degroot.  Finished Z-pack today. Still has left ear pain, fluid feeling, pressure at times towards jaw. Very \"slight improvement\". Continues to take Prednisone.Using Benadryl prn, using daily zyrtec, Ibuprofen as directed, and Flonase.      Pain is NOT constant.   Flying on Wednesday, returning Sunday. Feels he needs an additional antibiotic.  Advised that Azithromycin will stay in system for a few more days.  Quite nervous about flying with earache.   Additional advice for patient?  He does not want another OV if possible (high deductible.)    Thank you,  Janina Keen RN       "

## 2018-03-26 ENCOUNTER — NURSE TRIAGE (OUTPATIENT)
Dept: NURSING | Facility: CLINIC | Age: 33
End: 2018-03-26

## 2018-04-24 ENCOUNTER — MYC MEDICAL ADVICE (OUTPATIENT)
Dept: FAMILY MEDICINE | Facility: CLINIC | Age: 33
End: 2018-04-24

## 2018-04-24 DIAGNOSIS — H92.09 OTALGIA, UNSPECIFIED LATERALITY: Primary | ICD-10-CM

## 2018-04-26 NOTE — TELEPHONE ENCOUNTER
Please notify pt that ENT referral has been made for     Your provider has referred you to: N: [facility] [phone]   [email or web address]

## 2018-05-02 ENCOUNTER — TRANSFERRED RECORDS (OUTPATIENT)
Dept: HEALTH INFORMATION MANAGEMENT | Facility: CLINIC | Age: 33
End: 2018-05-02

## 2018-05-07 ENCOUNTER — OFFICE VISIT (OUTPATIENT)
Dept: FAMILY MEDICINE | Facility: CLINIC | Age: 33
End: 2018-05-07
Payer: COMMERCIAL

## 2018-05-07 VITALS
SYSTOLIC BLOOD PRESSURE: 116 MMHG | WEIGHT: 205 LBS | BODY MASS INDEX: 27.17 KG/M2 | HEART RATE: 62 BPM | DIASTOLIC BLOOD PRESSURE: 76 MMHG | HEIGHT: 73 IN | OXYGEN SATURATION: 100 % | TEMPERATURE: 97.4 F

## 2018-05-07 DIAGNOSIS — J34.2 DEVIATED NASAL SEPTUM: ICD-10-CM

## 2018-05-07 DIAGNOSIS — Z01.818 PREOP GENERAL PHYSICAL EXAM: Primary | ICD-10-CM

## 2018-05-07 DIAGNOSIS — Z23 NEED FOR TDAP VACCINATION: ICD-10-CM

## 2018-05-07 PROCEDURE — 99214 OFFICE O/P EST MOD 30 MIN: CPT | Mod: 25 | Performed by: INTERNAL MEDICINE

## 2018-05-07 PROCEDURE — 90715 TDAP VACCINE 7 YRS/> IM: CPT | Performed by: INTERNAL MEDICINE

## 2018-05-07 PROCEDURE — 90471 IMMUNIZATION ADMIN: CPT | Performed by: INTERNAL MEDICINE

## 2018-05-07 PROCEDURE — 93000 ELECTROCARDIOGRAM COMPLETE: CPT | Performed by: INTERNAL MEDICINE

## 2018-05-07 NOTE — MR AVS SNAPSHOT
After Visit Summary   5/7/2018    Donte Mixon    MRN: [id number]           Patient Information     Date Of Birth          1985        Visit Information        Provider Department      5/7/2018 4:40 PM Megan Degroot MD [facility]        Today's Diagnoses     Preop general physical exam    -  1    Deviated nasal septum        Need for Tdap vaccination          Care Instructions      Before Your Surgery      Call your surgeon if there is any change in your health. This includes signs of a cold or flu (such as a sore throat, runny nose, cough, rash or fever).    Do not smoke, drink alcohol or take over the counter medicine (unless your surgeon or primary care doctor tells you to) for the 24 hours before and after surgery.    If you take prescribed drugs: Follow your doctor s orders about which medicines to take and which to stop until after surgery.    Eating and drinking prior to surgery: follow the instructions from your surgeon    Take a shower or bath the night before surgery. Use the soap your surgeon gave you to gently clean your skin. If you do not have soap from your surgeon, use your regular soap. Do not shave or scrub the surgery site.  Wear clean pajamas and have clean sheets on your bed.           Follow-ups after your visit        Your next 10 appointments already scheduled     May 23, 2018  5:20 PM CDT   (Arrive by 5:05 PM)   New Patient Visit with Riana Saunders MD   [facility] ([facility])    [address] [place] [id number] [phone]              Who to contact     If you have questions or need follow up information about today's clinic visit or your schedule please contact [facility] directly at [phone].  Normal or non-critical lab and imaging results will be communicated to you by MyChart, letter or phone within 4 business days after the clinic has received the results. If  "you do not hear from us within 7 days, please contact the clinic through [employer] or phone. If you have a critical or abnormal lab result, we will notify you by phone as soon as possible.  Submit refill requests through [employer] or call your pharmacy and they will forward the refill request to us. Please allow 3 business days for your refill to be completed.          Additional Information About Your Visit        [employer] Information     [employer] gives you secure access to your electronic health record. If you see a primary care provider, you can also send messages to your care team and make appointments. If you have questions, please call your primary care clinic.  If you do not have a primary care provider, please call [phone] and they will assist you.        Care EveryWhere ID     This is your Care EveryWhere ID. This could be used by other organizations to access your [facility] medical records  [id number]        Your Vitals Were     Pulse Temperature Height Pulse Oximetry BMI (Body Mass Index)       62 97.4  F (36.3  C) (Tympanic) 6' 1.25\" (1.861 m) 100% 26.86 kg/m2        Blood Pressure from Last 3 Encounters:   05/07/18 116/76   03/15/18 116/75   07/24/17 128/76    Weight from Last 3 Encounters:   05/07/18 205 lb (93 kg)   03/15/18 211 lb 4.8 oz (95.8 kg)   09/29/17 200 lb (90.7 kg)              We Performed the Following     EKG 12-lead complete w/read - Clinics     TDAP VACCINE (ADACEL)        Primary Care Provider Office Phone # Fax #    Papo Ritter [phone]       [address] [address]        Equal Access to Services     [facility] : Hadrupali Lopez, nehemias chandler, comfort garibay. So [facility] [place] [phone].    ATENCIÓN: Si habla español, tiene a ferreira disposición servicios gratuitos de asistencia lingüística. Llame al [phone].    We comply with applicable federal civil rights laws and " [place] laws. We do not discriminate on the basis of race, color, national origin, age, disability, sex, sexual orientation, or gender identity.            Thank you!     Thank you for choosing [facility]  for your care. Our goal is always to provide you with excellent care. Hearing back from our patients is one way we can continue to improve our services. Please take a few minutes to complete the written survey that you may receive in the mail after your visit with us. Thank you!             Your Updated Medication List - Protect others around you: Learn how to safely use, store and throw away your medicines at www.disposemymeds.org.          This list is accurate as of 5/7/18  5:33 PM.  Always use your most recent med list.                   Brand Name Dispense Instructions for use Diagnosis    fluticasone 50 MCG/ACT spray    FLONASE     Spray 2 sprays into both nostrils daily

## 2018-05-07 NOTE — PROGRESS NOTES
[address] [address]  [phone]  Dept: [phone]    PRE-OP EVALUATION:  Today's date: 2018    Donte Mixon (: 1985) presents for pre-operative evaluation assessment as requested by Dr. Quinton Butt.  He requires evaluation and anesthesia risk assessment prior to undergoing surgery/procedure for treatment of Deviated Septum.    Proposed Surgery/ Procedure: Deviated Septum Repair  Date of Surgery/ Procedure: 5/15/2018  Time of Surgery/ Procedure: 10:00 Am  Hospital/Surgical Facility: [facility]  Fax number for surgical facility: [phone]  Primary Physician: Papo Ritter  Type of Anesthesia Anticipated: General    Patient has a Health Care Directive or Living Will:  NO    1. NO - Do you have a history of heart attack, stroke, stent, bypass or surgery on an artery in the head, neck, heart or legs?  2. NO - Do you ever have any pain or discomfort in your chest?  3. NO - Do you have a history of  Heart Failure?  4. NO - Are you troubled by shortness of breath when: walking on the level, up a slight hill or at night?  5. NO - Do you currently have a cold, bronchitis or other respiratory infection?  6. NO - Do you have a cough, shortness of breath or wheezing?  7. NO - Do you sometimes get pains in the calves of your legs when you walk?  8. NO - Do you or anyone in your family have previous history of blood clots?  9. NO - Do you or does anyone in your family have a serious bleeding problem such as prolonged bleeding following surgeries or cuts?  10. NO - Have you ever had problems with anemia or been told to take iron pills?  11. YES - HAVE YOU HAD ANY ABNORMAL BLOOD LOSS SUCH AS BLACK, TARRY OR BLOODY STOOLS, OR ABNORMAL VAGINAL BLEEDING? Rectal bleeding - hemorrhoids  12. NO - Have you ever had a blood transfusion?  13. NO - Have you or any of your relatives ever had problems with anesthesia?  14. NO - Do you have sleep apnea, excessive  snoring or daytime drowsiness?  15. NO - Do you have any prosthetic heart valves?  16. NO - Do you have prosthetic joints?  17. NO - Is there any chance that you may be pregnant?      HPI:     HPI related to upcoming procedure: Patient Donte Mixon is a very pleasant 32 year old male with a history of deviated nasal septum who presents to internal medicine clinic for a pre op cardiac evaluation for upcoming ENT surgery for Deviated Septum Repair for treatment of deviated nasal septum. Patient denies any chest pain, headaches, fever or chills. He denies any known history of allergies to anesthesia agents. Patient has no prior history of CAD, CVA, Type 2 Diabetes, or bleeding disorders. He does not take any daily aspirin or any other blood thinner medications.        MEDICAL HISTORY:     Patient Active Problem List    Diagnosis Date Noted     Hemorrhoids, unspecified hemorrhoid type 07/24/2017     Priority: Medium     Testicular pain 07/11/2016     Priority: Medium     Routine general medical examination at a health care facility 11/10/2015     Priority: Medium      Past Medical History:   Diagnosis Date     Hoarseness     Off and on over the years.     No past surgical history on file.  Current Outpatient Prescriptions   Medication Sig Dispense Refill     fluticasone (FLONASE) 50 MCG/ACT spray Spray 2 sprays into both nostrils daily  6     OTC products: None, except as noted above    Allergies   Allergen Reactions     Sulfa Drugs Hives      Latex Allergy: NO    Social History   Substance Use Topics     Smoking status: Never Smoker     Smokeless tobacco: Never Used     Alcohol use 4.8 oz/week     History   Drug Use No       REVIEW OF SYSTEMS:   Constitutional, neuro, ENT, endocrine, pulmonary, cardiac, gastrointestinal, genitourinary, musculoskeletal, integument and psychiatric systems are negative, except as otherwise noted.    EXAM:   /76 (BP Location: Right arm, Cuff Size: Adult Large)  Pulse 62  Temp 97.4  F  "(36.3  C) (Tympanic)  Ht 6' 1.25\" (1.861 m)  Wt 205 lb (93 kg)  SpO2 100%  BMI 26.86 kg/m2    GENERAL APPEARANCE: healthy, alert and no distress     EYES: EOMI,  PERRL     HENT: ear canals and TM's normal and nose and mouth without ulcers or lesions. deviated nasal septum present.     NECK: no adenopathy, no asymmetry, masses, or scars and thyroid normal to palpation     RESP: lungs clear to auscultation - no rales, rhonchi or wheezes     CV: regular rates and rhythm, normal S1 S2, no S3 or S4 and no murmur, click or rub     ABDOMEN:  soft, nontender, no HSM or masses and bowel sounds normal     MS: extremities normal- no gross deformities noted, no evidence of inflammation in joints, FROM in all extremities.     SKIN: no suspicious lesions or rashes     NEURO: Normal strength and tone, sensory exam grossly normal, mentation intact and speech normal     PSYCH: mentation appears normal. and affect normal/bright     LYMPHATICS: No cervical adenopathy    DIAGNOSTICS:   EKG: Sinus  Bradycardia   -RSR(V1) -nondiagnostic.     PROBABLY NORMAL   no acute ST/T changes c/w ischemia, no LVH by voltage criteria      Lab Results   Component Value Date    HGB 14.0 07/24/2017     Lab Results   Component Value Date    HCT 43.7 07/24/2017     No components found for: MCT  Lab Results   Component Value Date    MCV 98.3 07/24/2017     Lab Results   Component Value Date    MCH 31.5 07/24/2017     Lab Results   Component Value Date    MCHC 32.0 07/24/2017         IMPRESSION:   Reason for surgery/procedure: deviated nasal septum  Diagnosis/reason for consult: pre op cardiac evaluation for upcoming ENT surgery for Deviated Septum Repair for treatment of deviated nasal septum    The proposed surgical procedure is considered INTERMEDIATE risk.    REVISED CARDIAC RISK INDEX  The patient has the following serious cardiovascular risks for perioperative complications such as (MI, PE, VFib and 3  AV Block):  No serious cardiac " risks  INTERPRETATION: 0 risks: Class I (very low risk - 0.4% complication rate)    The patient has the following additional risks for perioperative complications:  No identified additional risks      RECOMMENDATIONS:     --Patient is to take all scheduled medications on the day of surgery.    APPROVAL GIVEN to proceed with proposed procedure, without further diagnostic evaluation       Signed Electronically by: Megan Degroot MD    Copy of this evaluation report is provided to requesting physician.    [place] Preop Guidelines    Revised Cardiac Risk Index

## 2018-05-08 ENCOUNTER — TELEPHONE (OUTPATIENT)
Dept: FAMILY MEDICINE | Facility: CLINIC | Age: 33
End: 2018-05-08

## 2018-05-08 NOTE — TELEPHONE ENCOUNTER
Reason for Call:  Pre op    Detailed comments: Pt requesting we fax over the pre op info he believes he provided the incorrect fax number  Please fax to [phone] [facility]        Call taken on 5/8/2018 at 3:15 PM by Delfina Ghotra

## 2018-05-14 ENCOUNTER — TELEPHONE (OUTPATIENT)
Dept: OTOLARYNGOLOGY | Facility: CLINIC | Age: 33
End: 2018-05-14

## 2018-05-14 NOTE — TELEPHONE ENCOUNTER
[facility] Call Center    Phone Message    May a detailed message be left on voicemail: yes    Reason for Call: Other: Please follow up with pt, he would like to know how far out is surgery dates.     Action Taken: Message routed to:  [facility] ([facility] maria m ent gen

## 2018-05-15 ENCOUNTER — MYC MEDICAL ADVICE (OUTPATIENT)
Dept: FAMILY MEDICINE | Facility: CLINIC | Age: 33
End: 2018-05-15

## 2018-05-16 NOTE — TELEPHONE ENCOUNTER
"Called patient:   Pt was supposed to have sx on nose   Went on MyChart - overdue \"thing for depression\"   Stressed with work and some other things but does not have depression     Per health maintenance, pt due for routing PHQ-9 screening. Informed pt message was likely an automated message sent from the system and that sometimes they check PHQ-9 at a routine physical.     Geraldine MALAVE RN    "

## 2018-05-25 ENCOUNTER — OFFICE VISIT (OUTPATIENT)
Dept: FAMILY MEDICINE | Facility: CLINIC | Age: 33
End: 2018-05-25
Payer: COMMERCIAL

## 2018-05-25 VITALS
TEMPERATURE: 97.1 F | BODY MASS INDEX: 26.51 KG/M2 | HEART RATE: 81 BPM | OXYGEN SATURATION: 98 % | WEIGHT: 200 LBS | HEIGHT: 73 IN | SYSTOLIC BLOOD PRESSURE: 114 MMHG | DIASTOLIC BLOOD PRESSURE: 73 MMHG

## 2018-05-25 DIAGNOSIS — F41.1 GAD (GENERALIZED ANXIETY DISORDER): ICD-10-CM

## 2018-05-25 DIAGNOSIS — K64.8 HEMORRHOIDS, INTERNAL, WITH BLEEDING: Primary | ICD-10-CM

## 2018-05-25 DIAGNOSIS — F33.1 MODERATE EPISODE OF RECURRENT MAJOR DEPRESSIVE DISORDER (H): ICD-10-CM

## 2018-05-25 PROCEDURE — 99214 OFFICE O/P EST MOD 30 MIN: CPT | Performed by: NURSE PRACTITIONER

## 2018-05-25 RX ORDER — BUPROPION HYDROCHLORIDE 150 MG/1
150 TABLET ORAL EVERY MORNING
Qty: 90 TABLET | Refills: 1 | Status: SHIPPED | OUTPATIENT
Start: 2018-05-25 | End: 2018-10-01

## 2018-05-25 ASSESSMENT — ANXIETY QUESTIONNAIRES
2. NOT BEING ABLE TO STOP OR CONTROL WORRYING: NEARLY EVERY DAY
1. FEELING NERVOUS, ANXIOUS, OR ON EDGE: NEARLY EVERY DAY
5. BEING SO RESTLESS THAT IT IS HARD TO SIT STILL: MORE THAN HALF THE DAYS
7. FEELING AFRAID AS IF SOMETHING AWFUL MIGHT HAPPEN: MORE THAN HALF THE DAYS
6. BECOMING EASILY ANNOYED OR IRRITABLE: NOT AT ALL
IF YOU CHECKED OFF ANY PROBLEMS ON THIS QUESTIONNAIRE, HOW DIFFICULT HAVE THESE PROBLEMS MADE IT FOR YOU TO DO YOUR WORK, TAKE CARE OF THINGS AT HOME, OR GET ALONG WITH OTHER PEOPLE: VERY DIFFICULT
3. WORRYING TOO MUCH ABOUT DIFFERENT THINGS: NEARLY EVERY DAY
GAD7 TOTAL SCORE: 15

## 2018-05-25 ASSESSMENT — PATIENT HEALTH QUESTIONNAIRE - PHQ9: 5. POOR APPETITE OR OVEREATING: MORE THAN HALF THE DAYS

## 2018-05-25 NOTE — PROGRESS NOTES
SUBJECTIVE:   Donte Mixon is a 32 year old male who presents to clinic today for the following health issues:      Rectal Bleeding      Duration: Long term, recently came back last month    Accompanying signs and symptoms: No constipation or pain with BM    History (similar episodes/previous evaluation): Previously saw Dr. Ritter and rx'd foam    Precipitating or alleviating factors: Proctofoam    Therapies tried and outcome: Proctofoam-did help       Abnormal Mood Symptoms  This is main worry for patient-he is very ashamed of this and it is hard for him to discuss    Duration: Over a month of symptom severity; unable to discuss with his wife who is distressed and worried about him    Description:  Depression: Unknown-doesn't know the difference between anxiety and depression  Anxiety: YES  Panic attacks: no -but has come close    Accompanying signs and symptoms: see PHQ-9 and DAVID scores; denies SI, generally no problems with sleep; has a lot of problem concentrating at work - always has had some problem- takes him longer to do things than others     History (similar episodes/previous evaluation): Has had in the past but always went away on its own; because he is able to bury his feelings    Precipitating or alleviating factors:  Stress at work-wife is due in 1 month-worries about the future, guilty about the past;   Therapies tried and outcome: none  Family history of anxiety, depression and addiction in all 4 sibs and his father who he is much like      Problem list and histories reviewed & adjusted, as indicated.  Additional history: as documented    Patient Active Problem List   Diagnosis     Routine general medical examination at a health care facility     Testicular pain     Hemorrhoids, unspecified hemorrhoid type     No past surgical history on file.    Social History   Substance Use Topics     Smoking status: Never Smoker     Smokeless tobacco: Never Used     Alcohol use 2.4 oz/week     4 Standard drinks  "or equivalent per week     Family History   Problem Relation Age of Onset     Coronary Artery Disease Mother      Hypertension Mother      Hyperlipidemia Mother      CEREBROVASCULAR DISEASE Mother      Migraines Mother      Colon Cancer Maternal Grandmother      Other Cancer Paternal Grandmother      ? type     DIABETES Paternal Grandmother      GASTROINTESTINAL DISEASE Maternal Grandfather      diverticulitis         Current Outpatient Prescriptions   Medication Sig Dispense Refill     buPROPion (WELLBUTRIN XL) 150 MG 24 hr tablet Take 1 tablet (150 mg) by mouth every morning 90 tablet 1     fluticasone (FLONASE) 50 MCG/ACT spray Spray 2 sprays into both nostrils daily  6     pramoxine (PROCTOFOAM) 1 % foam Place rectally every 2 hours as needed for hemorrhoids 15 g 0     Allergies   Allergen Reactions     Sulfa Drugs Hives       Reviewed and updated as needed this visit by clinical staff       Reviewed and updated as needed this visit by Provider         ROS:  Constitutional, HEENT, cardiovascular, pulmonary, gi and gu systems are negative, except as otherwise noted.    OBJECTIVE:     /73 (BP Location: Right arm, Patient Position: Chair, Cuff Size: Adult Large)  Pulse 81  Temp 97.1  F (36.2  C) (Tympanic)  Ht 6' 1.25\" (1.861 m)  Wt 200 lb (90.7 kg)  SpO2 98%  BMI 26.21 kg/m2  Body mass index is 26.21 kg/(m^2).  GENERAL: healthy, alert and no distress  EYES: Eyes grossly normal to inspection, PERRL and conjunctivae and sclerae normal  NECK: no adenopathy, no asymmetry, masses, or scars and thyroid normal to palpation  RESP: lungs clear to auscultation - no rales, rhonchi or wheezes  CV: regular rate and rhythm, normal S1 S2, no S3 or S4, no murmur, click or rub,   PSYCH:  Poor insight, flat affect, tearing, repressed   Diagnostic Test Results:  none     ASSESSMENT/PLAN:         ICD-10-CM    1. Hemorrhoids, internal, with bleeding K64.8 pramoxine (PROCTOFOAM) 1 % foam   2. DAVID (generalized anxiety " disorder) F41.1 buPROPion (WELLBUTRIN XL) 150 MG 24 hr tablet        3. Moderate episode of recurrent major depressive disorder (H) F33.1 buPROPion (WELLBUTRIN XL) 150 MG 24 hr tablet     MENTAL HEALTH REFERRAL  - Adult; Assessments and Testing, Outpatient Treatment; General Psychological Testing; [facility]: Health Psychology - Adiel Kendall [phone]; Patient call to schedule; Individual/Couples/Family/Group Therapy/Health Psycholog...   I have spoken about potential for side effects which should diminish over first 8-10 days.  If intolerable he will stop medication and call me.  Otherwise he will follow up with me in 2 weeks.    He will call to schedule counselling ; if he meets any roadblocks I have asked him to contact me so I can facilitate       TT 30min  CT 25min  MELINDA García [facility]

## 2018-05-25 NOTE — MR AVS SNAPSHOT
After Visit Summary   5/25/2018    Donte Mixon    MRN: [id number]           Patient Information     Date Of Birth          1985        Visit Information        Provider Department      5/25/2018 9:30 AM Leslie Hearn APRN CNP [facility]        Today's Diagnoses     Hemorrhoids, internal, with bleeding    -  1    DAVID (generalized anxiety disorder)        Moderate episode of recurrent major depressive disorder (H)           Follow-ups after your visit        Additional Services     MENTAL HEALTH REFERRAL  - Adult; Assessments and Testing, Outpatient Treatment; General Psychological Testing; [facility]: Health Psychology - Adiel Kendall [phone]; Patient call to schedule; Individual/Couples/Family/Group Therapy/Health Psycholog...       All scheduling is subject to the client's specific insurance plan & benefits, provider/location availability, and provider clinical specialities.  Please arrive 15 minutes early for your first appointment and bring your completed paperwork.    Please be aware that coverage of these services is subject to the terms and limitations of your health insurance plan.  Call member services at your health plan with any benefit or coverage questions.                            Your next 10 appointments already scheduled     Jun 13, 2018 11:40 AM CDT   (Arrive by 11:25 AM)   New Patient Visit with Riana Saunders MD   [facility] ([facility])    [address] [address] [phone]              Who to contact     If you have questions or need follow up information about today's clinic visit or your schedule please contact [facility] directly at [phone].  Normal or non-critical lab and imaging results will be communicated to you by MyChart, letter or phone within 4 business days after the clinic has received the results. If you do not hear from us within 7 days, please  "contact the clinic through [employer] or phone. If you have a critical or abnormal lab result, we will notify you by phone as soon as possible.  Submit refill requests through [employer] or call your pharmacy and they will forward the refill request to us. Please allow 3 business days for your refill to be completed.          Additional Information About Your Visit        [employer] Information     [employer] gives you secure access to your electronic health record. If you see a primary care provider, you can also send messages to your care team and make appointments. If you have questions, please call your primary care clinic.  If you do not have a primary care provider, please call [phone] and they will assist you.        Care EveryWhere ID     This is your Care EveryWhere ID. This could be used by other organizations to access your [place] medical records  [id number]        Your Vitals Were     Pulse Temperature Height Pulse Oximetry BMI (Body Mass Index)       81 97.1  F (36.2  C) (Tympanic) 6' 1.25\" (1.861 m) 98% 26.21 kg/m2        Blood Pressure from Last 3 Encounters:   05/25/18 114/73   05/07/18 116/76   03/15/18 116/75    Weight from Last 3 Encounters:   05/25/18 200 lb (90.7 kg)   05/07/18 205 lb (93 kg)   03/15/18 211 lb 4.8 oz (95.8 kg)              We Performed the Following     MENTAL HEALTH REFERRAL  - Adult; Assessments and Testing, Outpatient Treatment; General Psychological Testing; [facility]: Health Psychology - Adiel Kendall [phone]; Patient call to schedule; Individual/Couples/Family/Group Therapy/Health Psycholog...          Today's Medication Changes          These changes are accurate as of 5/25/18 10:18 AM.  If you have any questions, ask your nurse or doctor.               Start taking these medicines.        Dose/Directions    buPROPion 150 MG 24 hr tablet   Commonly known as:  WELLBUTRIN XL   Used for:  DAVID (generalized anxiety disorder), Moderate episode of recurrent major depressive " disorder (H)   Started by:  Leslie Hearn APRN CNP        Dose:  150 mg   Take 1 tablet (150 mg) by mouth every morning   Quantity:  90 tablet   Refills:  1       pramoxine 1 % foam   Commonly known as:  PROCTOFOAM   Used for:  Hemorrhoids, internal, with bleeding   Started by:  Leslie Hearn APRN CNP        Place rectally every 2 hours as needed for hemorrhoids   Quantity:  15 g   Refills:  0            Where to get your medicines      These medications were sent to [id number] - [place], [place] - [address] [facility]  [address], [place] [address]     Phone:  [phone]     buPROPion 150 MG 24 hr tablet    pramoxine 1 % foam                Primary Care Provider Office Phone # Fax #    Megan Filiberto Degroot [phone] [address]  [place] [address]        Equal Access to Services     [facility] AH: Hadii aad ku hadasho Soomaali, waaxda luqadaha, qaybta kaalmada adeegyada, waxay idiin haybradly valdes . So [facility] [phone].    ATENCIÓN: Si habla español, tiene a ferreira disposición servicios gratuitos de asistencia lingüística. [facility] [phone].    We comply with applicable federal civil rights laws and [place] laws. We do not discriminate on the basis of race, color, national origin, age, disability, sex, sexual orientation, or gender identity.            Thank you!     Thank you for choosing [facility]  for your care. Our goal is always to provide you with excellent care. Hearing back from our patients is one way we can continue to improve our services. Please take a few minutes to complete the written survey that you may receive in the mail after your visit with us. Thank you!             Your Updated Medication List - Protect others around you: Learn how to safely use, store and throw away your medicines at www.disposemymeds.org.          This list is accurate as of 5/25/18 10:18 AM.  Always use  your most recent med list.                   Brand Name Dispense Instructions for use Diagnosis    buPROPion 150 MG 24 hr tablet    WELLBUTRIN XL    90 tablet    Take 1 tablet (150 mg) by mouth every morning    DAVID (generalized anxiety disorder), Moderate episode of recurrent major depressive disorder (H)       fluticasone 50 MCG/ACT spray    FLONASE     Spray 2 sprays into both nostrils daily        pramoxine 1 % foam    PROCTOFOAM    15 g    Place rectally every 2 hours as needed for hemorrhoids    Hemorrhoids, internal, with bleeding

## 2018-05-26 ASSESSMENT — PATIENT HEALTH QUESTIONNAIRE - PHQ9: SUM OF ALL RESPONSES TO PHQ QUESTIONS 1-9: 15

## 2018-05-26 ASSESSMENT — ANXIETY QUESTIONNAIRES: GAD7 TOTAL SCORE: 15

## 2018-06-01 ENCOUNTER — TELEPHONE (OUTPATIENT)
Dept: FAMILY MEDICINE | Facility: CLINIC | Age: 33
End: 2018-06-01

## 2018-06-01 NOTE — TELEPHONE ENCOUNTER
LMOM that I spoke with [facility] psych who said they would be able to get Donte in for talk therapy end June or early July.  But also noted that he does have an psych appt with [facility] 6/22.    I have asked Donte to get back to me on Monday to review appts and efficacy of welbutrin therapy.

## 2018-06-04 ENCOUNTER — TELEPHONE (OUTPATIENT)
Dept: FAMILY MEDICINE | Facility: CLINIC | Age: 33
End: 2018-06-04

## 2018-06-04 DIAGNOSIS — F41.1 GAD (GENERALIZED ANXIETY DISORDER): Primary | ICD-10-CM

## 2018-06-04 NOTE — TELEPHONE ENCOUNTER
Reason for Call:  Other call back    Detailed comments: Please try to contact patient again to discuss further treatment until he can get into his pscologist.    Phone Number Patient can be reached at: Cell number on file:    Telephone Information:   Mobile [phone]       Best Time: please keep trying    Can we leave a detailed message on this number? NO    Call taken on 6/4/2018 at 3:15 PM by Yoselyn Cooper

## 2018-06-04 NOTE — TELEPHONE ENCOUNTER
LMOM that I have been unable to get him and earlier appt than 6/22 with psychology.  He is asked to call me to discuss what we might do in the meantime

## 2018-06-05 RX ORDER — CITALOPRAM HYDROBROMIDE 20 MG/1
20 TABLET ORAL DAILY
Qty: 90 TABLET | Refills: 1 | Status: SHIPPED | OUTPATIENT
Start: 2018-06-05 | End: 2018-10-01

## 2018-06-05 NOTE — TELEPHONE ENCOUNTER
I did leave a message yesterday in response to this request.  Have made several attempts since last week without any luck

## 2018-06-05 NOTE — TELEPHONE ENCOUNTER
Spoke with Donte .  He is not getting any noticeable benefit or adverse reaction with the welbutrin.  Still feeling quite anxious. He does have an appt to speak with psychologist and 2 cancellation spots lined up as well.    I will introduce an SSRI at this time celexa 20mg and he is comfortable with the plan.  He will continue the welbutrin as well.

## 2018-06-15 ENCOUNTER — OFFICE VISIT (OUTPATIENT)
Dept: FAMILY MEDICINE | Facility: CLINIC | Age: 33
End: 2018-06-15
Payer: COMMERCIAL

## 2018-06-15 VITALS
SYSTOLIC BLOOD PRESSURE: 117 MMHG | DIASTOLIC BLOOD PRESSURE: 77 MMHG | HEART RATE: 70 BPM | WEIGHT: 198 LBS | OXYGEN SATURATION: 100 % | HEIGHT: 73 IN | BODY MASS INDEX: 26.24 KG/M2 | TEMPERATURE: 96.4 F

## 2018-06-15 DIAGNOSIS — Z79.899 MEDICATION MANAGEMENT: Primary | ICD-10-CM

## 2018-06-15 PROCEDURE — 99213 OFFICE O/P EST LOW 20 MIN: CPT | Performed by: NURSE PRACTITIONER

## 2018-06-15 ASSESSMENT — ANXIETY QUESTIONNAIRES
2. NOT BEING ABLE TO STOP OR CONTROL WORRYING: NEARLY EVERY DAY
IF YOU CHECKED OFF ANY PROBLEMS ON THIS QUESTIONNAIRE, HOW DIFFICULT HAVE THESE PROBLEMS MADE IT FOR YOU TO DO YOUR WORK, TAKE CARE OF THINGS AT HOME, OR GET ALONG WITH OTHER PEOPLE: VERY DIFFICULT
1. FEELING NERVOUS, ANXIOUS, OR ON EDGE: NEARLY EVERY DAY
7. FEELING AFRAID AS IF SOMETHING AWFUL MIGHT HAPPEN: MORE THAN HALF THE DAYS
3. WORRYING TOO MUCH ABOUT DIFFERENT THINGS: MORE THAN HALF THE DAYS
5. BEING SO RESTLESS THAT IT IS HARD TO SIT STILL: MORE THAN HALF THE DAYS
6. BECOMING EASILY ANNOYED OR IRRITABLE: NOT AT ALL
GAD7 TOTAL SCORE: 15

## 2018-06-15 ASSESSMENT — PATIENT HEALTH QUESTIONNAIRE - PHQ9: 5. POOR APPETITE OR OVEREATING: NEARLY EVERY DAY

## 2018-06-15 NOTE — PROGRESS NOTES
SUBJECTIVE:   Donte Mixon is a 32 year old male who presents to clinic today for the following health issues:      Follow up for Hemorrhoids    Medication Followup of Depression    Taking Medication as prescribed: yes    Side Effects:  None    Medication Helping Symptoms:  Still having daily unchanged anxiety, not better and not worse, feels the celexa is making him nauseated and causing yawning     Problem list and histories reviewed & adjusted, as indicated.  Additional history: as documented    Patient Active Problem List   Diagnosis     Routine general medical examination at a health care facility     Testicular pain     Hemorrhoids, unspecified hemorrhoid type     No past surgical history on file.    Social History   Substance Use Topics     Smoking status: Never Smoker     Smokeless tobacco: Never Used     Alcohol use 2.4 oz/week     4 Standard drinks or equivalent per week     Family History   Problem Relation Age of Onset     Coronary Artery Disease Mother      Hypertension Mother      Hyperlipidemia Mother      CEREBROVASCULAR DISEASE Mother      Migraines Mother      Colon Cancer Maternal Grandmother      Other Cancer Paternal Grandmother      ? type     DIABETES Paternal Grandmother      GASTROINTESTINAL DISEASE Maternal Grandfather      diverticulitis         Current Outpatient Prescriptions   Medication Sig Dispense Refill     buPROPion (WELLBUTRIN XL) 150 MG 24 hr tablet Take 1 tablet (150 mg) by mouth every morning 90 tablet 1     citalopram (CELEXA) 20 MG tablet Take 1 tablet (20 mg) by mouth daily Begin by taking 1/2 tab nightly at bedtime for 8 days then increase to a whole tablet 90 tablet 1     fluticasone (FLONASE) 50 MCG/ACT spray Spray 2 sprays into both nostrils daily  6     pramoxine (PROCTOFOAM) 1 % foam Place rectally every 2 hours as needed for hemorrhoids 15 g 0     Allergies   Allergen Reactions     Sulfa Drugs Hives       Reviewed and updated as needed this visit by clinical staff      "  Reviewed and updated as needed this visit by Provider         ROS:  Constitutional, HEENT, cardiovascular, pulmonary, gi and gu systems are negative, except as otherwise noted.    OBJECTIVE:     /77 (BP Location: Right arm, Cuff Size: Adult Large)  Pulse 70  Temp 96.4  F (35.8  C) (Tympanic)  Ht 6' 1.25\" (1.861 m)  Wt 198 lb (89.8 kg)  SpO2 100%  BMI 25.94 kg/m2  Body mass index is 25.94 kg/(m^2).  GENERAL: healthy, alert and no distress  RESP: lungs clear to auscultation - no rales, rhonchi or wheezes  CV: regular rate and rhythm, normal S1 S2, no S3 or S4, no murmur, click or rub, no peripheral edema and peripheral pulses strong    Diagnostic Test Results:  PHQ9  GAD7    ASSESSMENT/PLAN:         ICD-10-CM    1. Medication management Z79.899 MENTAL HEALTH REFERRAL  - Adult; Outpatient Treatment; Individual/Couples/Family/Group Therapy/Health Psychology; [facility]: Psychiatry Clinic [phone]; We will contact you to schedule the appointment or please call with any questions       Patient Instructions   Donte has decided to give the celexa more time.  I expect the nausea will resolve , he may continue to experience jaw tightness.  He has appt on 6/22 with psychologist for behavior training in controlling anxiety and will contact psychiatry for some assistance in medication management.    We did discuss weaning from welbutrin and stopping celexa and beginning either paxil or effexor    MELINDA García [facility]  "

## 2018-06-15 NOTE — MR AVS SNAPSHOT
After Visit Summary   6/15/2018    Donte Mixon    MRN: [id number]           Patient Information     Date Of Birth          1985        Visit Information        Provider Department      6/15/2018 8:30 AM Eaton,  Ursula, APRN CNP [facility] [place]        Today's Diagnoses     Medication management    -  1       Follow-ups after your visit        Additional Services     MENTAL HEALTH REFERRAL  - Adult; Outpatient Treatment; Individual/Couples/Family/Group Therapy/Health Psychology; [facility]: Psychiatry Clinic [phone]; We will contact you to schedule the appointment or please call with any questions       All scheduling is subject to the client's specific insurance plan & benefits, provider/location availability, and provider clinical specialities.  Please arrive 15 minutes early for your first appointment and bring your completed paperwork.    Please be aware that coverage of these services is subject to the terms and limitations of your health insurance plan.  Call member services at your health plan with any benefit or coverage questions.                            Your next 10 appointments already scheduled     Jun 22, 2018 10:00 AM CDT   (Arrive by 9:45 AM)   New Patient Visit with Laurie Pettit, PhD   [facility]    [address] [place] [address] [phone]            Jun 27, 2018 12:00 PM CDT   (Arrive by 11:45 AM)   New Patient Visit with Riana Saunders MD   [facility]    [address] [place] [address] [phone]              Who to contact     If you have questions or need follow up information about today's clinic visit or your schedule please contact [facility] [place] directly at [phone].  Normal or non-critical lab and imaging results will be communicated to you by MyChart, letter or  "phone within 4 business days after the clinic has received the results. If you do not hear from us within 7 days, please contact the clinic through [employer] or phone. If you have a critical or abnormal lab result, we will notify you by phone as soon as possible.  Submit refill requests through [employer] or call your pharmacy and they will forward the refill request to us. Please allow 3 business days for your refill to be completed.          Additional Information About Your Visit        [employer] Information     [employer] gives you secure access to your electronic health record. If you see a primary care provider, you can also send messages to your care team and make appointments. If you have questions, please call your primary care clinic.  If you do not have a primary care provider, please call [phone] and they will assist you.        Care EveryWhere ID     This is your Care EveryWhere ID. This could be used by other organizations to access your [place] medical records  [id number]        Your Vitals Were     Pulse Temperature Height Pulse Oximetry BMI (Body Mass Index)       70 96.4  F (35.8  C) (Tympanic) 6' 1.25\" (1.861 m) 100% 25.94 kg/m2        Blood Pressure from Last 3 Encounters:   06/15/18 117/77   05/25/18 114/73   05/07/18 116/76    Weight from Last 3 Encounters:   06/15/18 198 lb (89.8 kg)   05/25/18 200 lb (90.7 kg)   05/07/18 205 lb (93 kg)              We Performed the Following     MENTAL HEALTH REFERRAL  - Adult; Outpatient Treatment; Individual/Couples/Family/Group Therapy/Health Psychology; [facility]: Psychiatry Clinic [phone]; We will contact you to schedule the appointment or please call with any questions        Primary Care Provider Office Phone # Fax #    Megan Filiberto Degroot -[phone] [address]  [facility] [address]        Equal Access to Services     RASHAD FERRARI AH: Hadii delaney Lopez, wasebleda luqadaha, qaybta kacomfort ferrell " mendy nolenbellaanny vargasAngibradly ah. So [facility] [phone].    ATENCIÓN: Si habla heriberto, tiene a ferreira disposición servicios gratuitos de asistencia lingüística. Madeline peters [phone].    We comply with applicable federal civil rights laws and [place] laws. We do not discriminate on the basis of race, color, national origin, age, disability, sex, sexual orientation, or gender identity.            Thank you!     Thank you for choosing [facility]  for your care. Our goal is always to provide you with excellent care. Hearing back from our patients is one way we can continue to improve our services. Please take a few minutes to complete the written survey that you may receive in the mail after your visit with us. Thank you!             Your Updated Medication List - Protect others around you: Learn how to safely use, store and throw away your medicines at www.disposemymeds.org.          This list is accurate as of 6/15/18  8:55 AM.  Always use your most recent med list.                   Brand Name Dispense Instructions for use Diagnosis    buPROPion 150 MG 24 hr tablet    WELLBUTRIN XL    90 tablet    Take 1 tablet (150 mg) by mouth every morning    DAVID (generalized anxiety disorder), Moderate episode of recurrent major depressive disorder (H)       citalopram 20 MG tablet    celeXA    90 tablet    Take 1 tablet (20 mg) by mouth daily Begin by taking 1/2 tab nightly at bedtime for 8 days then increase to a whole tablet    DAVID (generalized anxiety disorder)       fluticasone 50 MCG/ACT spray    FLONASE     Spray 2 sprays into both nostrils daily        pramoxine 1 % foam    PROCTOFOAM    15 g    Place rectally every 2 hours as needed for hemorrhoids    Hemorrhoids, internal, with bleeding

## 2018-06-16 ASSESSMENT — ANXIETY QUESTIONNAIRES: GAD7 TOTAL SCORE: 15

## 2018-06-16 ASSESSMENT — PATIENT HEALTH QUESTIONNAIRE - PHQ9: SUM OF ALL RESPONSES TO PHQ QUESTIONS 1-9: 18

## 2018-06-22 ENCOUNTER — OFFICE VISIT (OUTPATIENT)
Dept: PSYCHOLOGY | Facility: CLINIC | Age: 33
End: 2018-06-22
Payer: COMMERCIAL

## 2018-06-22 DIAGNOSIS — F32.1 MODERATE SINGLE CURRENT EPISODE OF MAJOR DEPRESSIVE DISORDER (H): Primary | ICD-10-CM

## 2018-06-22 NOTE — MR AVS SNAPSHOT
After Visit Summary   6/22/2018    Donte Mixon    MRN: [id number]           Patient Information     Date Of Birth          1985        Visit Information        Provider Department      6/22/2018 10:00 AM Laurie Pettit, PhD [facility]        Care Instructions          REDUCING SYMPATHETIC NERVOUS  SYSTEM  Activity                                  is the path way to reduced pain!                                                     Tools  To Help      1.  Guided imagery- to reduce sympathetic nervous system ( flight or fight)  activity      I recommend you listen once a day,  In quiet place,  Eyes closed.   (NOT  Driving) .    At the beginning,  You can start with 5  Minutes and gradually work up to the whole segment.  May take several weeks,  B/c your sympathetic nervous system is in the habit of being on high.   So go slow and be respectful of the challenge of change for your body.      Recommended source =   [employer]     Relief Stress        2.  Auditory EMDR  Also called  Bilateral Music or Biolateral Music-   For reducing sympathetic nervous system activity or anxiety quickly.      MUST USE WITH HEADPHONES OR EAR BUDS   In order to be effective.    Set  At barely audible level.    Use whenever you feel tense or have trouble sleeping or if you anticipate those problems.     Useful for 5 min or 5 hours.      Can use while reading or surfing Internet of doing chores.      Recommended source  =   Album       Bilateral Music by Adriano Osullivan                                              Available on Amazon,  I tunes or  Spotify    Also called         Written on Clouds    3.  Weighted blanket -- some  Research has demonstrated that a weighted blanket can reduce night time anxiety  And improve sleep quality.   A number of my  pain patients also report benefit from using weighted blanket ( typically 15 to 20 lbs for adult size)    There are multiple sources on the  "Internet to purchase and/ or you can google instructions for how to \" do it yourself\"    You can My Chart me with questions.        Laurie Pettit PhD., LP                          Follow-ups after your visit        Your next 10 appointments already scheduled     Jun 27, 2018 12:00 PM CDT   (Arrive by 11:45 AM)   New Patient Visit with Riana Saunders MD   [facility] ([facility])    [address] [facility]  [address] [facility] [address] [phone]            Jul 02, 2018  7:30 AM CDT   Adult General Eval with MELINDA Mandujano CNP   Psychiatry Clinic ([facility])    [place] [id number]  [address] [address] [phone]              Who to contact     Please call your clinic at [phone] to:    Ask questions about your health    Make or cancel appointments    Discuss your medicines    Learn about your test results    Speak to your doctor            Additional Information About Your Visit        [employer] Information     [employer] gives you secure access to your electronic health record. If you see a primary care provider, you can also send messages to your care team and make appointments. If you have questions, please call your primary care clinic.  If you do not have a primary care provider, please call [phone] and they will assist you.      [employer] is an electronic gateway that provides easy, online access to your medical records. With [employer], you can request a clinic appointment, read your test results, renew a prescription or communicate with your care team.     To access your existing account, please contact your [facility] or call [phone] for assistance.        Care EveryWhere ID     This is your Care EveryWhere ID. This could be used by other organizations to access your [place] medical records  [id number]         Blood Pressure from Last 3 Encounters: "   06/15/18 117/77   05/25/18 114/73   05/07/18 116/76    Weight from Last 3 Encounters:   06/15/18 89.8 kg (198 lb)   05/25/18 90.7 kg (200 lb)   05/07/18 93 kg (205 lb)              Today, you had the following     No orders found for display       Primary Care Provider Office Phone # Fax #    Megan Filiberto Degroot [phone] [address] [facility] [address]  [facility] [address]        Equal Access to Services     [facility]: Hadii aad ku hadasho Soomaali, waaxda luqadaha, qaybta kaalmada aderimayajosafat, comfort valdes . So [facility] [phone].    ATENCIÓN: Si habla español, tiene a ferreira disposición servicios gratuitos de asistencia lingüística. [facility] [phone].    We comply with applicable federal civil rights laws and [place] laws. We do not discriminate on the basis of race, color, national origin, age, disability, sex, sexual orientation, or gender identity.            Thank you!     Thank you for choosing [facility]  for your care. Our goal is always to provide you with excellent care. Hearing back from our patients is one way we can continue to improve our services. Please take a few minutes to complete the written survey that you may receive in the mail after your visit with us. Thank you!             Your Updated Medication List - Protect others around you: Learn how to safely use, store and throw away your medicines at www.disposemymeds.org.          This list is accurate as of 6/22/18 10:57 AM.  Always use your most recent med list.                   Brand Name Dispense Instructions for use Diagnosis    buPROPion 150 MG 24 hr tablet    WELLBUTRIN XL    90 tablet    Take 1 tablet (150 mg) by mouth every morning    DAVID (generalized anxiety disorder), Moderate episode of recurrent major depressive disorder (H)       citalopram 20 MG tablet    celeXA    90 tablet    Take 1 tablet (20 mg) by mouth daily Begin by taking 1/2 tab nightly at bedtime for 8 days  then increase to a whole tablet    DAVID (generalized anxiety disorder)       fluticasone 50 MCG/ACT spray    FLONASE     Spray 2 sprays into both nostrils daily        pramoxine 1 % foam    PROCTOFOAM    15 g    Place rectally every 2 hours as needed for hemorrhoids    Hemorrhoids, internal, with bleeding

## 2018-06-22 NOTE — PATIENT INSTRUCTIONS
"      REDUCING SYMPATHETIC NERVOUS  SYSTEM  Activity                                  is the path way to reduced pain!                                                     Tools  To Help      1.  Guided imagery- to reduce sympathetic nervous system ( flight or fight)  activity      I recommend you listen once a day,  In quiet place,  Eyes closed.   (NOT  Driving) .    At the beginning,  You can start with 5  Minutes and gradually work up to the whole segment.  May take several weeks,  B/c your sympathetic nervous system is in the habit of being on high.   So go slow and be respectful of the challenge of change for your body.      Recommended source =   Giftology.Sentrinsic     Relief Stress        2.  Auditory EMDR  Also called  Bilateral Music or Biolateral Music-   For reducing sympathetic nervous system activity or anxiety quickly.      MUST USE WITH HEADPHONES OR EAR BUDS   In order to be effective.    Set  At barely audible level.    Use whenever you feel tense or have trouble sleeping or if you anticipate those problems.     Useful for 5 min or 5 hours.      Can use while reading or surfing Internet of doing chores.      Recommended source  =   Album       Bilateral Music by Adriano Osullivan                                              Available on Amazon,  I tunes or  Spotify    Also called         Written on Clouds    3.  Weighted blanket -- some  Research has demonstrated that a weighted blanket can reduce night time anxiety  And improve sleep quality.   A number of my  pain patients also report benefit from using weighted blanket ( typically 15 to 20 lbs for adult size)    There are multiple sources on the Internet to purchase and/ or you can google instructions for how to \" do it yourself\"    You can My Chart me with questions.        Laurie Pettit PhD., LP                  "

## 2018-06-22 NOTE — PROGRESS NOTES
[facility]  Medical Psychology Diagnostic Assessment        Patient Name: Donte Mixon    YOB: 1985   Medical Record Number: [id number]  Date: 6/22/2018     Donte Mixon   is a 32 year old, ,  and currently employed full time.       Referring PHYSICIAN:    PCP           PRESENTING CONCERNS OF REFERRING PROVIDER:   Patients anxiety sx       PATIENT'S  PRESENTING PROBLEMS  anxiety        ASSESSMENT:            SUMMARY AND IMPRESSIONS:  This pleasant gentleman gives a history of  general anxiety symptoms since college that have worsened in the past few months.   He also reports sx of depression including poor self esteem, though he had no awareness that the depression label would also fit.  He has never received treatment and has considerable shame re psych medication and psychotherapy.   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   And his wife is due to deliver their first child in about 2 weeks.   These intertwined events seem to be the source of his recent increase in symptoms.   Recently, started Celexa and Wellbutrin no benefit yet and seeing psychiatry in near future.           SLEEP:   Fair     SUBSTANCES NOT PRESCRIBED   no    PERSONALITY FACTORS:  obsessive    PSYCHOSOCIAL: mixed     PRESCRIPTION COMPLIANCE  CONCERNS :   No                      MOTIVATION LEVEL FOR  treatment  Good but money and impending baby are barriers.            FUNCTIONAL  STATUS  Client's symptoms are causing reduced functional status in the following areas: Occupational / Vocational -   Social / Relational -                     DIAGNOSES:       DAVID     Major Depression mild                                              PLAN   Was  Formulated and  discussed with patient.   Patient agreed to treatment plan.           Recommend Psychological Therapy Yes               FREQUENCY: weekly         BEHAVIORAL  TREATMENT PLAN     Presenting Problems with  initial Behavioral Treatment Objectives:        Improve anxiety management skills : Goals include:  auditory EMDR and Management .         Management of Anxiety. Goals include:  guided imagery for  Stress management              DETAILED EVALUATION  Of :        OTHER   RELEVANT DIAGNOSES:  Wife due with first child in 2 weeks    PSYCHOLOGY SYMPTOMS:     Review of Symptoms:  Depression: Sleep Interest Guilt Energy Concentration Hopeless Ruminations Irritability some libido loss   Intensity: mild to moderate   Has been  excessive sleepy  Poor self esteem    Janina:  No symptoms  Psychosis: No symptoms  Anxiety: Worries Nervousness Describe:  stess in stomach  unhappy butterflies  Intensity: mild to moderate  Panic:  No symptoms  Post Traumatic Stress Disorder: No symptoms  Obsessive Compulsive Disorder: No symptoms  some OCD in hcildhood with need for order  Eating Disorder: No symptoms    ADD / ADHD: Attention Task Completion Distractiblity   May need eval at some point,  Reports life long     SLEEP PROBLEMS          Currently:  mild    Has had hypersomnia        Sleep Medications: no      Previous sleep study or treatment: n/a                 CPAP  Use n/a                          Suicide Risk: denies current or recent suicidal ideation                     CONTRIBUTING COMPONENTS :   Traumatized from pain or other medical   experiences:no         Anticipatory anxiety that movement or activity will cause pain or re injury:                   no              ANGER / IRRITABILITY: No          Resentment or blame regarding cause of condition or treatment: No        CURRENT  Medical/surgical  History:   Please see PCP's notes for more details of @HIS, findings and recommendations                PT'S UNDERSTANDING ABOUT THE CAUSE OF  Presenting problem:   consistent  With medical information                      LEVEL OF SELF-MANAGEMENT: poor      BEHAVIORAL  METHODS OF COPING /  MANAGEMENT :   Distraction,               ABILITY TO RELAX: poor      ABILITY TO PACE ACTIVITY: good      OBEYS LIMITATIONS: good                                    PROBLEMS AND IMPAIRMENTS DUE TO  :      Overall Quality of Life: Fair       ADL   Good       Household tasks   Good                            CURRENT MEDICATIONS:    Current Outpatient Prescriptions   Medication Sig Dispense Refill     buPROPion (WELLBUTRIN XL) 150 MG 24 hr tablet Take 1 tablet (150 mg) by mouth every morning 90 tablet 1     citalopram (CELEXA) 20 MG tablet Take 1 tablet (20 mg) by mouth daily Begin by taking 1/2 tab nightly at bedtime for 8 days then increase to a whole tablet 90 tablet 1     fluticasone (FLONASE) 50 MCG/ACT spray Spray 2 sprays into both nostrils daily  6     pramoxine (PROCTOFOAM) 1 % foam Place rectally every 2 hours as needed for hemorrhoids 15 g 0   .            Past medical problems:   Past Medical History:   Diagnosis Date     Hoarseness     Off and on over the years.           History of Head Trauma: Yes,  Multiple   Various sports   Including was catcher  And basket ball                   MENTAL HEALTH HISTORY:       Previous History of:           Depression or Anxiety:  Yes, reported   Anxiety off and on general worry  Since college   But this level of mood disorder is new          ADD:   Denied              OCD:  Yes, reported  As kid            Bipolar Disorder: Denied            Schizophrenia:  Denied            Eating Disorder   Denied            PTSD   Denied            History of  Losses / Abuse/Trauma   no indications or report of: significant losses, trauma, abuse or neglect         Past Mental Health Treatment:  has not received mental health services in the past      Current psychotropic medications:New Antidepressants:  Celexa (citalopram) and Wellbutrin, Zyban, Aplenzin (bupropion)      Currently seeing psychiatrist or therapist: is not currently receiving any mental health services          Previous  CD problems/treatment  has not received chemical dependency treatment in the past         Patient reports ALCOHOL use  mild      CAGE QUESTIONNAIRE:   None of the patient's responses to the CAGE screening were positive / Negative CAGE score   Not in past 3-4 years  Heavy as young adult  Patient   Reports  Marijuana none   Patient denies using street drugs.  Patient  denies the non-medical use of prescription or over the counter drugs.                                OCCUPATIONAL AND EDUCATIONAL HISTORY:        Occupations:    Insurance sales          Job Satisfaction:  Medium     Made a big switch to sales              Education Level:  college graduate        History:  No       Disability status: Not applicable       Legal case pending: no    SOCIAL HISTORY:      Social History   Substance Use Topics     Smoking status: Never Smoker     Smokeless tobacco: Never Used     Alcohol use 2.4 oz/week     4 Standard drinks or equivalent per week          Current living situation: with wife          Length of time with spouse/partner: 8  years       Relationship Satisfaction: good  Wife in [employer] sales                 Children: 0       Social Support  From friends: supportive with frequent communication       Social: Good           Individual activities   Have not been working out   golf                 FAMILY HISTORY:       Family Status: The patient was raised in [place] by his biological parents.       Parents:  Father: and                   Mother: are stoic [place]      They  when he was 20       Siblings: oldest of 5        Family History of Behavioral Health Problems:  Yes, reported sibling depression and anxiety,   Dad    One sister with ADD /anxiety       Family History of Substance Abuse: Yes, reported one brother s/p opiate addiction       General Upbringing: Ok         Family Relationships Good *but awkward                 Health Behaviors:         There is no height or weight on file to  calculate BMI.                        Diet: good                Exercise: no regular exercise program        Caffeine   moderate   Tobacco -  No,                                           PATIENT'S GOALS:       OBJECTIVE:    Mental Status  And BEHAVIORAL OBSERVATIONS:             Mental Status Assessment:  Appearance:   Appropriate   Eye Contact:   Good   Psychomotor Behavior: Normal  Retarded (Slowed)   Attitude:   Cooperative   Orientation:   All  Speech   Rate / Production: Normal    Volume:  Normal   Mood:    anxious  Affect:    Constricted  Subdued   Thought Content:  Clear   Thought Form:  Coherent  Logical   Insight:    Fair         A 12-item WHODAS 2.0 assessment was declined by the patient    PHQ-9 score:    PHQ-9 SCORE 6/15/2018   Total Score 18     Indicating severe depression    DAVID 7 from Constanza 15  Was 15.   Indicating moderately severe depression            Time spent with Patient:   60   Minutes  in direct patient contact for a psychological evaluation.        Laurie Pettit Ph.D., L.P. ...............  6/22/2018 10:01 AM    Medical Psychologist

## 2018-06-27 ENCOUNTER — OFFICE VISIT (OUTPATIENT)
Dept: OTOLARYNGOLOGY | Facility: CLINIC | Age: 33
End: 2018-06-27
Payer: COMMERCIAL

## 2018-06-27 DIAGNOSIS — J34.2 DEVIATED NASAL SEPTUM: Primary | ICD-10-CM

## 2018-06-27 DIAGNOSIS — J34.3 HYPERTROPHY OF INFERIOR NASAL TURBINATE: ICD-10-CM

## 2018-06-27 DIAGNOSIS — J34.89 NASAL OBSTRUCTION: ICD-10-CM

## 2018-06-27 ASSESSMENT — PAIN SCALES - GENERAL: PAINLEVEL: NO PAIN (0)

## 2018-06-27 NOTE — LETTER
6/27/2018       RE: Donte Mixon  [address]  [facility] [address]     Dear Colleague,    Thank you for referring your patient, Donte Mixon, to the [facility] at [facility]. Please see a copy of my visit note below.    Facial Plastic and Reconstructive Surgery Consultation    Referring Provider:   Papo Ritter MD  [address] [address]    HPI:   I had the pleasure of seeing Donte Mixon today in clinic for consultation for nasal obstruction, primarily left sided.   Donte Mixon is a 32 year old patient referred by Dr. Ramya Simon for deviated nasal septum with nasal obstruction.    He has a history of difficult time breathing out of the left side of his nose.  The difficulty ranges from mild to severe at times.  He was seen by an ENT in [place] and was told that he had a deviated septum.  He denies any history of nasal trauma.  He does feel like his nostrils are asymmetric and that the left one is way smaller than the right one.  He has not been on any nasal sprays in terms of a nasal steroid spray.  He has used Afrin at times which does seem to help.  He denies any issues with sinusitis.     Dr. Simon noted a caudal septal deflection with rightward posterior deflection, and since this involves the nasal structure referred him to me. Of note the patient had seen an outside otolaryngologist who had recommended a septoplasty with inferior turbinate reduction, he cancelled surgery the night before as he was concerned that this would not comprehensively address his breathing problem.     He is expecting this first child any day now.        Review Of Systems  ROS: 10 point ROS neg other than the symptoms noted above in the HPI.    Patient Active Problem List   Diagnosis     Routine general medical examination at a health care facility     Testicular pain     Hemorrhoids, unspecified hemorrhoid type     No past surgical history on  file.  Current Outpatient Prescriptions   Medication Sig Dispense Refill     buPROPion (WELLBUTRIN XL) 150 MG 24 hr tablet Take 1 tablet (150 mg) by mouth every morning 90 tablet 1     citalopram (CELEXA) 20 MG tablet Take 1 tablet (20 mg) by mouth daily Begin by taking 1/2 tab nightly at bedtime for 8 days then increase to a whole tablet 90 tablet 1     fluticasone (FLONASE) 50 MCG/ACT spray Spray 2 sprays into both nostrils daily  6     pramoxine (PROCTOFOAM) 1 % foam Place rectally every 2 hours as needed for hemorrhoids 15 g 0     Sulfa drugs  Social History     Social History     Marital status:      Spouse name: N/A     Number of children: N/A     Years of education: N/A     Occupational History     Not on file.     Social History Main Topics     Smoking status: Never Smoker     Smokeless tobacco: Never Used     Alcohol use 2.4 oz/week     4 Standard drinks or equivalent per week     Drug use: No     Sexual activity: Yes     Partners: Female     Other Topics Concern      Service No     Blood Transfusions No     Caffeine Concern No     2cpd     Occupational Exposure No     Hobby Hazards No     Sleep Concern No     Stress Concern No     Weight Concern No     Special Diet No     Back Care No     Exercise Yes     4 per week crossfit and yoga     Bike Helmet Yes     NA     Seat Belt Yes     Self-Exams Yes     Social History Narrative    Lives with fiance.  Works going well.  Works in [employer].  Feels safe in environment.  Has a good support network.  Moved from [place] a year ago.    Chelsi Ruiz PA-C    11/10/2015         Family History   Problem Relation Age of Onset     Coronary Artery Disease Mother      Hypertension Mother      Hyperlipidemia Mother      Cerebrovascular Disease Mother      Migraines Mother      Colon Cancer Maternal Grandmother      Other Cancer Paternal Grandmother      ? type     Diabetes Paternal Grandmother      GASTROINTESTINAL DISEASE Maternal Grandfather       diverticulitis       PE:  Alert and Oriented, Answering Questions Appropriately  Atraumatic, Normocephalic, Face Symmetric  Skin: Srinivasan 2  Facial Nerve Intact and facial movement symmetric  EOM's, PEERL  Nasal Exam: Narrowed left nostril from caudal septal deflection, posterior septal deflection to the right obstructing the right nasal airway, enlarged left inferior turbinate no mucopurulence or polyps, no masses, left nasal valve stenosed  Chin: Normal   Lips/Teeth/Toungue/Gums: Lips intact, Normal Dentition, Occlusion intact, Oral mucosa intact, no lesions/ulcerations/masses, Tongue mobile  OP: Palate elevates with speech, Mallampati 2, Uvula midline  Neck: No lymphadenopathy, no thyromegaly, trachea midline  Chest: No wheezing, cyanosis, or stridor  Card: Normal upper extremity pulses and capillary refill, not diaphoretic  Neuro/Psych: CN's 2-12 intact, Moves all extremities, ambulation in intact, positive affect, no notable muscle weakness            IMPRESSION:    Deviated septum  Nasal obstruction  Inferior turbinate hypertrophy        PLAN:    Donte Mixon presents today for consultation for nasal obstruction. He is significantly debilitated by the inability to effectively move air through his nose. There are visible structural deficits that would not be improved with medical therapy. The noted deformities on exam require surgical correction. These would not be addressed or corrected with a septoplasty alone, as the structural deficits arise in the dorsal L strut supportive aspect of the septum.     He also has inferior turbinate hypertrophy which would benefit from reduction.  I agree with Dr. Simon that he requires a more extensive surgery than just a septoplasty alone        Photodocumentation was obtained.     I spent a total of 20 minutes face-to-face with Donte Mixon during today's office visit.  Over 50% of this time was spent counseling the patient and/or coordinating care regarding his nasal  obstruction.  See note for details.        Again, thank you for allowing me to participate in the care of your patient.      Sincerely,    Riana Saunders MD

## 2018-06-27 NOTE — MR AVS SNAPSHOT
After Visit Summary   6/27/2018    Donte Mixon    MRN: [id number]           Patient Information     Date Of Birth          1985        Visit Information        Provider Department      6/27/2018 12:00 PM Riana Saunders MD [facility] Ear Nose [facility] Th[facility]        Today's Diagnoses     Deviated nasal septum    -  1    Nasal obstruction        Hypertrophy of inferior nasal turbinate           Follow-ups after your visit        Your next 10 appointments already scheduled     Jul 02, 2018  7:30 AM CDT   Adult General Eval with MELINDA Mandujano CNP   Psychiatry Clinic ([facility])    [place] [id number]  [address] [address] [phone]              Who to contact     Please call your clinic at [phone] to:    Ask questions about your health    Make or cancel appointments    Discuss your medicines    Learn about your test results    Speak to your doctor            Additional Information About Your Visit        MyChart Information     [employer] gives you secure access to your electronic health record. If you see a primary care provider, you can also send messages to your care team and make appointments. If you have questions, please call your primary care clinic.  If you do not have a primary care provider, please call [phone] and they will assist you.      [employer] is an electronic gateway that provides easy, online access to your medical records. With [employer], you can request a clinic appointment, read your test results, renew a prescription or communicate with your care team.     To access your existing account, please contact your [facility] or call [phone] for assistance.        Care EveryWhere ID     This is your Care EveryWhere ID. This could be used by other organizations to access your [facility] medical records  [id number]         Blood Pressure from Last 3 Encounters:   06/15/18 117/77    05/25/18 114/73   05/07/18 116/76    Weight from Last 3 Encounters:   06/15/18 89.8 kg (198 lb)   05/25/18 90.7 kg (200 lb)   05/07/18 93 kg (205 lb)              Today, you had the following     No orders found for display       Primary Care Provider Office Phone # Fax #    Megan Filiberto Degroot [phone] [address] [facility] [address]  [facility] [address]        Equal Access to Services     RASHAD FERRARI : Hadii aad ku hadasho Soomaali, waaxda luqadaha, qaybta kaalmada adeegyada, waxay allysonin hayaan adeeg nicki valdes . So [facility] [phone].    ATENCIÓN: Si habla español, tiene a ferreira disposición servicios gratuitos de asistencia lingüística. [facility] [phone].    We comply with applicable federal civil rights laws and [place] laws. We do not discriminate on the basis of race, color, national origin, age, disability, sex, sexual orientation, or gender identity.            Thank you!     Thank you for choosing [facility] EAR [facility]SE AND TH[facility]AT  for your care. Our goal is always to provide you with excellent care. Hearing back from our patients is one way we can continue to improve our services. Please take a few minutes to complete the written survey that you may receive in the mail after your visit with us. Thank you!             Your Updated Medication List - Protect others around you: Learn how to safely use, store and throw away your medicines at www.disposemymeds.org.          This list is accurate as of 6/27/18  6:12 PM.  Always use your most recent med list.                   Brand Name Dispense Instructions for use Diagnosis    buPROPion 150 MG 24 hr tablet    WELLBUTRIN XL    90 tablet    Take 1 tablet (150 mg) by mouth every morning    DAVID (generalized anxiety disorder), Moderate episode of recurrent major depressive disorder (H)       citalopram 20 MG tablet    celeXA    90 tablet    Take 1 tablet (20 mg) by mouth daily Begin by taking 1/2 tab nightly at bedtime for 8 days then increase to a  whole tablet    DAVID (generalized anxiety disorder)       fluticasone 50 MCG/ACT spray    FLONASE     Spray 2 sprays into both nostrils daily        pramoxine 1 % foam    PROCTOFOAM    15 g    Place rectally every 2 hours as needed for hemorrhoids    Hemorrhoids, internal, with bleeding

## 2018-06-27 NOTE — NURSING NOTE
Photodocumentation obtained.  Will obtain prior auth and then work to schedule for septorhinoplasty.     Pre Op teaching completed.    Timmy Shay RN  6/27/2018 1:32 PM

## 2018-06-27 NOTE — PROGRESS NOTES
Facial Plastic and Reconstructive Surgery Consultation    Referring Provider:   Papo Ritter MD  [address] [address]    HPI:   I had the pleasure of seeing Donte Mixon today in clinic for consultation for nasal obstruction, primarily left sided.   Donte Mixon is a 32 year old patient referred by Dr. Ramya Simon for deviated nasal septum with nasal obstruction.    He has a history of difficult time breathing out of the left side of his nose.  The difficulty ranges from mild to severe at times.  He was seen by an ENT in [place] and was told that he had a deviated septum.  He denies any history of nasal trauma.  He does feel like his nostrils are asymmetric and that the left one is way smaller than the right one.  He has not been on any nasal sprays in terms of a nasal steroid spray.  He has used Afrin at times which does seem to help.  He denies any issues with sinusitis.     Dr. Simon noted a caudal septal deflection with rightward posterior deflection, and since this involves the nasal structure referred him to me. Of note the patient had seen an outside otolaryngologist who had recommended a septoplasty with inferior turbinate reduction, he cancelled surgery the night before as he was concerned that this would not comprehensively address his breathing problem.     He is expecting this first child any day now.        Review Of Systems  ROS: 10 point ROS neg other than the symptoms noted above in the HPI.    Patient Active Problem List   Diagnosis     Routine general medical examination at a health care facility     Testicular pain     Hemorrhoids, unspecified hemorrhoid type     No past surgical history on file.  Current Outpatient Prescriptions   Medication Sig Dispense Refill     buPROPion (WELLBUTRIN XL) 150 MG 24 hr tablet Take 1 tablet (150 mg) by mouth every morning 90 tablet 1     citalopram (CELEXA) 20 MG tablet Take 1 tablet (20 mg) by mouth daily Begin by taking 1/2 tab  nightly at bedtime for 8 days then increase to a whole tablet 90 tablet 1     fluticasone (FLONASE) 50 MCG/ACT spray Spray 2 sprays into both nostrils daily  6     pramoxine (PROCTOFOAM) 1 % foam Place rectally every 2 hours as needed for hemorrhoids 15 g 0     Sulfa drugs  Social History     Social History     Marital status:      Spouse name: N/A     Number of children: N/A     Years of education: N/A     Occupational History     Not on file.     Social History Main Topics     Smoking status: Never Smoker     Smokeless tobacco: Never Used     Alcohol use 2.4 oz/week     4 Standard drinks or equivalent per week     Drug use: No     Sexual activity: Yes     Partners: Female     Other Topics Concern      Service No     Blood Transfusions No     Caffeine Concern No     2cpd     Occupational Exposure No     Hobby Hazards No     Sleep Concern No     Stress Concern No     Weight Concern No     Special Diet No     Back Care No     Exercise Yes     4 per week crossfit and yoga     Bike Helmet Yes     NA     Seat Belt Yes     Self-Exams Yes     Social History Narrative    Lives with fiance.  Works going well.  Works in [employer].  Feels safe in environment.  Has a good support network.  Moved from [place] a year ago.    Chelsi Ruiz PA-C    11/10/2015         Family History   Problem Relation Age of Onset     Coronary Artery Disease Mother      Hypertension Mother      Hyperlipidemia Mother      Cerebrovascular Disease Mother      Migraines Mother      Colon Cancer Maternal Grandmother      Other Cancer Paternal Grandmother      ? type     Diabetes Paternal Grandmother      GASTROINTESTINAL DISEASE Maternal Grandfather      diverticulitis       PE:  Alert and Oriented, Answering Questions Appropriately  Atraumatic, Normocephalic, Face Symmetric  Skin: Srinivasan 2  Facial Nerve Intact and facial movement symmetric  EOM's, PEERL  Nasal Exam: Narrowed left nostril from caudal septal deflection, posterior  septal deflection to the right obstructing the right nasal airway, enlarged left inferior turbinate no mucopurulence or polyps, no masses, left nasal valve stenosed  Chin: Normal   Lips/Teeth/Toungue/Gums: Lips intact, Normal Dentition, Occlusion intact, Oral mucosa intact, no lesions/ulcerations/masses, Tongue mobile  OP: Palate elevates with speech, Mallampati 2, Uvula midline  Neck: No lymphadenopathy, no thyromegaly, trachea midline  Chest: No wheezing, cyanosis, or stridor  Card: Normal upper extremity pulses and capillary refill, not diaphoretic  Neuro/Psych: CN's 2-12 intact, Moves all extremities, ambulation in intact, positive affect, no notable muscle weakness            IMPRESSION:    Deviated septum  Nasal obstruction  Inferior turbinate hypertrophy        PLAN:    Donte Mixon presents today for consultation for nasal obstruction. He is significantly debilitated by the inability to effectively move air through his nose. There are visible structural deficits that would not be improved with medical therapy. The noted deformities on exam require surgical correction. These would not be addressed or corrected with a septoplasty alone, as the structural deficits arise in the dorsal L strut supportive aspect of the septum.     He also has inferior turbinate hypertrophy which would benefit from reduction.  I agree with Dr. Simon that he requires a more extensive surgery than just a septoplasty alone        Photodocumentation was obtained.     I spent a total of 20 minutes face-to-face with Donte Mixon during today's office visit.  Over 50% of this time was spent counseling the patient and/or coordinating care regarding his nasal obstruction.  See note for details.

## 2018-06-28 DIAGNOSIS — M95.0 NASAL DEFORMITY: ICD-10-CM

## 2018-06-28 DIAGNOSIS — J34.3 HYPERTROPHY OF INFERIOR NASAL TURBINATE: ICD-10-CM

## 2018-06-28 DIAGNOSIS — J34.89 NASAL OBSTRUCTION: ICD-10-CM

## 2018-06-28 DIAGNOSIS — J34.2 DEVIATED NASAL SEPTUM: Primary | ICD-10-CM

## 2018-06-28 RX ORDER — CEFAZOLIN SODIUM 1 G/50ML
1 INJECTION, SOLUTION INTRAVENOUS SEE ADMIN INSTRUCTIONS
Status: CANCELLED | OUTPATIENT
Start: 2018-06-28

## 2018-06-28 RX ORDER — DEXAMETHASONE SODIUM PHOSPHATE 10 MG/ML
10 INJECTION, SOLUTION INTRAMUSCULAR; INTRAVENOUS ONCE
Status: CANCELLED | OUTPATIENT
Start: 2018-06-28 | End: 2018-06-28

## 2018-07-12 NOTE — TELEPHONE ENCOUNTER
[place]      Last Written Prescription Date:  Historical only - No order    Last Office Visit : 6-7-18  Future Office visit:  none    Routing refill request to provider for review/approval because:  Medication is reported/historical - actual signed order needed.    Kathleen M Doege RN

## 2018-07-17 NOTE — TELEPHONE ENCOUNTER
[facility] Call Center    Phone Message    May a detailed message be left on voicemail: no    Reason for Call: Other: Kristen from [facility] on [address] called to verify our clinic received their refill request. Writer stated request had been received and was being addressed as of 7/12; a provider signature had been needed. Kristen expressed understanding, would like to know what the next step is so they can fill med for pt. Please advise when available.    Action Taken: Message routed to:  [facility] ([facility] ENT

## 2018-07-18 DIAGNOSIS — R09.81 NASAL CONGESTION: ICD-10-CM

## 2018-07-18 RX ORDER — FLUTICASONE PROPIONATE 50 MCG
2 SPRAY, SUSPENSION (ML) NASAL DAILY
Qty: 3 BOTTLE | Refills: 3 | OUTPATIENT
Start: 2018-07-18

## 2018-07-18 RX ORDER — FLUTICASONE PROPIONATE 50 MCG
2 SPRAY, SUSPENSION (ML) NASAL DAILY
Qty: 1 BOTTLE | Refills: 11 | Status: SHIPPED | OUTPATIENT
Start: 2018-07-18 | End: 2019-01-29

## 2018-07-18 NOTE — TELEPHONE ENCOUNTER
"There is an original order dated 10/17 written by Dr Simon, I refilled that.  There is a \"patient reported\" Flonase also in the chart  "

## 2018-07-31 ENCOUNTER — OFFICE VISIT (OUTPATIENT)
Dept: PSYCHIATRY | Facility: CLINIC | Age: 33
End: 2018-07-31
Attending: NURSE PRACTITIONER
Payer: COMMERCIAL

## 2018-07-31 VITALS
SYSTOLIC BLOOD PRESSURE: 123 MMHG | HEART RATE: 74 BPM | BODY MASS INDEX: 26.97 KG/M2 | WEIGHT: 205.8 LBS | DIASTOLIC BLOOD PRESSURE: 75 MMHG

## 2018-07-31 DIAGNOSIS — Z79.899 MEDICATION MANAGEMENT: ICD-10-CM

## 2018-07-31 PROCEDURE — G0463 HOSPITAL OUTPT CLINIC VISIT: HCPCS | Mod: ZF

## 2018-07-31 ASSESSMENT — PAIN SCALES - GENERAL: PAINLEVEL: NO PAIN (0)

## 2018-07-31 NOTE — PROGRESS NOTES
"  Psychiatry Clinic Medical Diagnostic Assessment               Donte Mixon is a 32 year old male who prefers the pronouns he, him, his, himself.  Therapist: Laurie Pettit  PCP: Megan Degroot  Other Providers: None    Referred by PCP for evaluation of depression and anxiety.      History was provided by patient who was a good historian.     Chief Complaint                                                                                                             \" I was taking medicine for anxiety and I believe a lot has happened for me the last couple weeks. \"     History of Present Illness                                                                                 4, 4      Reports low insight re: mood and his history of symptoms dating back to teen years. Currently taking Celexa 20mg daily (started early June 2018, some effect on anxiety, unsure of efficacy for mood), Wellbutrin XL 150mg QAM (started June 2018, tolerated, unsure of efficacy).    Pertinent Background:  This patient initially experienced symptoms as a teen, then later thinks his mood may have changed in college; he knows he has high internal motivation for academic and career success. He first recieved mental health care in May 2018.    Psych critical item history includes [no critical items].     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 He wishes he would have \"drilled down into company issues\" like forecasting, succession planning, his [employer]'s business model.    Growing dissatisfaction with his working for his father in law and being limited with his career progression may have contributed to his presenting to his PCP requesting anxiety and depression relief with meds, therapy.    - felt agitated with Wellbutrin monotherapy, felt better when Celexa was added  - coping skills include paced breathing, redirecting his anxious thoughts, walking, staying busy  - he " enjoys golfing, exercising, reading, working, asking questions, spending time with friends  - feels better overall when he exercises   - discusses benefit for exercise on focus, using resources to address distracting meetings, emails    Recent Symptoms:   Depression: with Celexa, Wellbutrin; he endorses low energy and indecisiveness  Elevated:  none  Psychosis:  none  Anxiety:  unsure of symptoms, voices guilt about the past and fear for his future (family, career, finances)  Panic Attack: describes a couple isolated episodes of dyspnea and trying to relieve heightened anxiety with a walk  Trauma Related:  none    ADVERSE EFFECTS: daily  MEDICAL CONCERNS: anticipates another nose surgery (history of deviated septum)    APPETITE: OK, weight stable  SLEEP: wakes briefly when he hears the cry of his daughter       Recent Substance Use:  Alcohol- prefers wine, drinks 1-2 glasses at a time   Tobacco- no   Caffeine- coffee/ tea [2 cups daily]   Opioids- no    Narcan Kit- N/A   Cannabis- no   Other Illicit Drugs-none     Substance Use History                                                                 None        Psychiatric History     Suicidal ideation- None   Suicide Attempt:   #- N/A   Most Recent- N/A  SIB- None   Janina- None    Psychosis- None    Violence/Aggression- None   Psych Hosp- None   ECT- None   Eating Disorder- None   Outpatient Programs- None       Psychiatric Medication Trials     Other than Celexa, Wellbutrin, he is psychotropic naive    Social/ Family History               [per patient report]                                                  1ea, 1ea     FINANCIAL SUPPORT- working       CHILDREN- infant daughter, Kenisha; his wife is staying at home with their daughter for the first four months       LIVING SITUATION- lives with wife and daughter      LEGAL- None  EARLY HISTORY/ EDUCATION- grew up oldest of five kids, describes an ideal childhood; his parents  when he was 20yo, graduated  high school [employer]; graduated with BA in Political Science from [employer]    SOCIAL/ SPIRITUAL SUPPORT- support from his wife of 2 years Romina, one sister; he describes his gary as new to him, he's getting back into looking at his gary after growing up Hinduism  CULTURAL INFLUENCES/ IMPACT- none       TRAUMA HISTORY- None  FEELS SAFE AT HOME- Yes  FAMILY HISTORY-  Sister- treated for anxiety and depression with Prozac, brother- untreated ADHD, sister- treated ADHD, younger brother- drug and alcohol addiction, in recovery 5 years, Dad- mood lability    Medical / Surgical History                                                                                                                     Patient Active Problem List   Diagnosis     Routine general medical examination at a health care facility     Testicular pain     Hemorrhoids, unspecified hemorrhoid type     Deviated nasal septum     Nasal obstruction     Hypertrophy of inferior nasal turbinate       No past surgical history on file.     Medical Review of Systems                                                                                                     2, 10     A comprehensive review of systems was performed and is negative other than noted in the HPI.  Denies List of hospitals in the [place]. Reviewed surgical history, anticipates possible nose surgery. History of sports related concussions, unsure if he lost consciousness as a child. Denies seizure history.    Allergy                                Sulfa drugs  Current Medications                                                                                                         Current Outpatient Prescriptions   Medication Sig Dispense Refill     buPROPion (WELLBUTRIN XL) 150 MG 24 hr tablet Take 1 tablet (150 mg) by mouth every morning 90 tablet 1     citalopram (CELEXA) 20 MG tablet Take 1 tablet (20 mg) by mouth daily Begin by taking 1/2 tab nightly at bedtime for 8 days then increase to a whole tablet 90 tablet 1      fluticasone (FLONASE) 50 MCG/ACT spray Spray 2 sprays into both nostrils daily 1 Bottle 11     fluticasone (FLONASE) 50 MCG/ACT spray Spray 2 sprays into both nostrils daily  6     pramoxine (PROCTOFOAM) 1 % foam Place rectally every 2 hours as needed for hemorrhoids 15 g 0     Vitals                                                                                                                         3, 3     /75  Pulse 74  Wt 93.4 kg (205 lb 12.8 oz)  BMI 26.97 kg/m2     Mental Status Exam                                                                                      9, 14 cog gs     Alertness: alert  and oriented  Appearance: well groomed  Behavior/Demeanor: cooperative, pleasant and calm, with good  eye contact   Speech: normal and regular rate and rhythm  Language: intact  Psychomotor: normal or unremarkable  Mood: anxious and worried  Affect: full range; was congruent to mood; was congruent to content  Thought Process/Associations: unremarkable  Thought Content:  Reports none;  Denies suicidal ideation, violent ideation, delusions, preoccupations, obsessions , phobia , magical thinking, over-valued ideas and paranoid ideation  Perception:  Reports none;  Denies auditory hallucinations, visual hallucinations, visual distortion seen as shadows , depersonalization and derealization  Insight: limited  Judgment: good  Cognition: (6) does  appear grossly intact; formal cognitive testing was not done  Gait and Station: unremarkable    Labs and Data                                                                                                                     Rating Scales:   PHQ9 and CAGE 1/4    PHQ9 Today:  6  PHQ-9 SCORE 5/25/2018 6/15/2018   Total Score 15 18     No lab results found.  No lab results found.    Diagnosis and Assessment                                                                             m2, h3     DIAGNOSIS: mild recurrent MDD    Today the following issues were  addressed:    1) meds, doses  2) therapy  3) self care    [place] Prescription Monitoring Program [] was checked today:  indicates no file.    PSYCHOTROPIC DRUG INTERACTIONS: Celexa, Flonase, Wellbutrin (increased risk for decreased seizure threshold).  Drug Interaction Management: Monitoring for adverse effects, routine vitals, periodic EKGs, using lowest therapeutic dose of [psychotropics] and patient is aware of risks    Plan                                                                                                                     m2, h3     1) he chooses to continue Wellbutrin XL 150mg QAM, Celexa 20mg daily  Medications-    2) encouraged therapy  Therapy- Continue    3) encouraged exercise regimen, reading  holistic health    RTC: as needed, he may choose to return to PCP or be seen by provider closer to his office    CRISIS NUMBERS:   Provided routinely in AVS.    Treatment Risk Statement:  The patient understands the risks, benefits, adverse effects and alternatives. Agrees to treatment with the capacity to do so. No medical contraindications to treatment. Agrees to call clinic for any problems. The patient understands to call 911 or go to the nearest ED if life threatening or urgent symptoms occur.     WHODAS 2.0  TODAY total score = N/A; [a 12-item WHODAS 2.0 assessment was not completed by the pt today and/or recorded in EPIC].     PROVIDER:  MELINDA Hunt CNP

## 2018-07-31 NOTE — MR AVS SNAPSHOT
After Visit Summary   7/31/2018    Donte Mixon    MRN: [id number]           Patient Information     Date Of Birth          1985        Visit Information        Provider Department      7/31/2018 2:00 PM Sofia Gallegos APRN [facility]        Today's Diagnoses     Medication management           Follow-ups after your visit        Follow-up notes from your care team     Return if symptoms worsen or fail to improve, for BP Recheck, Routine Visit.      Who to contact     Please call your clinic at [phone] to:    Ask questions about your health    Make or cancel appointments    Discuss your medicines    Learn about your test results    Speak to your doctor            Additional Information About Your Visit        [employer] Information     [employer] gives you secure access to your electronic health record. If you see a primary care provider, you can also send messages to your care team and make appointments. If you have questions, please call your primary care clinic.  If you do not have a primary care provider, please call [phone] and they will assist you.      [employer] is an electronic gateway that provides easy, online access to your medical records. With [employer], you can request a clinic appointment, read your test results, renew a prescription or communicate with your care team.     To access your existing account, please contact your [facility] or call [phone] for assistance.        Care EveryWhere ID     This is your Care EveryWhere ID. This could be used by other organizations to access your [facility] medical records  [id number]        Your Vitals Were     Pulse BMI (Body Mass Index)                74 26.97 kg/m2           Blood Pressure from Last 3 Encounters:   07/31/18 123/75   06/15/18 117/77   05/25/18 114/73    Weight from Last 3 Encounters:   07/31/18 93.4 kg (205 lb 12.8 oz)   06/15/18 89.8 kg (198 lb)   05/25/18 90.7 kg (200 lb)               Today, you had the following     No orders found for display       Primary Care Provider Office Phone # Fax #    Megan Filiberto Degroot [phone] [address] [place] [id number]        Equal Access to Services     GOLDENMARY GRACE VEGA : Hadii aad ku hadmarilino Sojaniaali, waaxda luqadaha, qaybta kaalmada adeegyada, waxla nena allysonin hayseverinon aderima caceres keisha . So [facility] [phone].    ATENCIÓN: Si habla español, tiene a ferreira disposición servicios gratuitos de asistencia lingüística. Llame al [phone].    We comply with applicable federal civil rights laws and [place] laws. We do not discriminate on the basis of race, color, national origin, age, disability, sex, sexual orientation, or gender identity.            Thank you!     Thank you for choosing [facility]RY CLINIC  for your care. Our goal is always to provide you with excellent care. Hearing back from our patients is one way we can continue to improve our services. Please take a few minutes to complete the written survey that you may receive in the mail after your visit with us. Thank you!             Your Updated Medication List - Protect others around you: Learn how to safely use, store and throw away your medicines at www.disposemymeds.org.          This list is accurate as of 7/31/18 11:59 PM.  Always use your most recent med list.                   Brand Name Dispense Instructions for use Diagnosis    buPROPion 150 MG 24 hr tablet    WELLBUTRIN XL    90 tablet    Take 1 tablet (150 mg) by mouth every morning    DAVID (generalized anxiety disorder), Moderate episode of recurrent major depressive disorder (H)       citalopram 20 MG tablet    celeXA    90 tablet    Take 1 tablet (20 mg) by mouth daily Begin by taking 1/2 tab nightly at bedtime for 8 days then increase to a whole tablet    DAVID (generalized anxiety disorder)       * fluticasone 50 MCG/ACT spray    FLONASE     Spray 2 sprays into both nostrils daily        * fluticasone 50  MCG/ACT spray    FLONASE    1 Bottle    Spray 2 sprays into both nostrils daily    Nasal congestion       pramoxine 1 % foam    PROCTOFOAM    15 g    Place rectally every 2 hours as needed for hemorrhoids    Hemorrhoids, internal, with bleeding       * Notice:  This list has 2 medication(s) that are the same as other medications prescribed for you. Read the directions carefully, and ask your doctor or other care provider to review them with you.

## 2018-08-02 ASSESSMENT — PATIENT HEALTH QUESTIONNAIRE - PHQ9: SUM OF ALL RESPONSES TO PHQ QUESTIONS 1-9: 6

## 2018-10-01 ENCOUNTER — OFFICE VISIT (OUTPATIENT)
Dept: FAMILY MEDICINE | Facility: CLINIC | Age: 33
End: 2018-10-01
Payer: COMMERCIAL

## 2018-10-01 VITALS
DIASTOLIC BLOOD PRESSURE: 66 MMHG | BODY MASS INDEX: 28.61 KG/M2 | TEMPERATURE: 97 F | SYSTOLIC BLOOD PRESSURE: 114 MMHG | HEART RATE: 66 BPM | HEIGHT: 73 IN | WEIGHT: 215.9 LBS | OXYGEN SATURATION: 98 %

## 2018-10-01 DIAGNOSIS — F41.1 GAD (GENERALIZED ANXIETY DISORDER): ICD-10-CM

## 2018-10-01 DIAGNOSIS — F33.1 MODERATE EPISODE OF RECURRENT MAJOR DEPRESSIVE DISORDER (H): Primary | ICD-10-CM

## 2018-10-01 DIAGNOSIS — Z23 NEED FOR PROPHYLACTIC VACCINATION AND INOCULATION AGAINST INFLUENZA: ICD-10-CM

## 2018-10-01 PROCEDURE — 99214 OFFICE O/P EST MOD 30 MIN: CPT | Mod: 25 | Performed by: INTERNAL MEDICINE

## 2018-10-01 PROCEDURE — 90686 IIV4 VACC NO PRSV 0.5 ML IM: CPT | Performed by: INTERNAL MEDICINE

## 2018-10-01 PROCEDURE — 90471 IMMUNIZATION ADMIN: CPT | Performed by: INTERNAL MEDICINE

## 2018-10-01 RX ORDER — BUPROPION HYDROCHLORIDE 150 MG/1
150 TABLET ORAL EVERY MORNING
Qty: 90 TABLET | Refills: 3 | Status: SHIPPED | OUTPATIENT
Start: 2018-10-01 | End: 2019-01-29

## 2018-10-01 RX ORDER — CITALOPRAM HYDROBROMIDE 20 MG/1
20 TABLET ORAL DAILY
Qty: 90 TABLET | Refills: 3 | Status: SHIPPED | OUTPATIENT
Start: 2018-10-01 | End: 2019-01-29

## 2018-10-01 NOTE — MR AVS SNAPSHOT
"              After Visit Summary   10/1/2018    Donte Mixon    MRN: [id number]           Patient Information     Date Of Birth          1985        Visit Information        Provider Department      10/1/2018 9:00 AM Megan Degroot MD [facility]        Today's Diagnoses     Moderate episode of recurrent major depressive disorder (H)    -  1    DAVID (generalized anxiety disorder)        Need for prophylactic vaccination and inoculation against influenza           Follow-ups after your visit        Who to contact     If you have questions or need follow up information about today's clinic visit or your schedule please contact [facility] directly at [phone].  Normal or non-critical lab and imaging results will be communicated to you by [employer], letter or phone within 4 business days after the clinic has received the results. If you do not hear from us within 7 days, please contact the clinic through [employer] or phone. If you have a critical or abnormal lab result, we will notify you by phone as soon as possible.  Submit refill requests through [employer] or call your pharmacy and they will forward the refill request to us. Please allow 3 business days for your refill to be completed.          Additional Information About Your Visit        MyChart Information     [employer] gives you secure access to your electronic health record. If you see a primary care provider, you can also send messages to your care team and make appointments. If you have questions, please call your primary care clinic.  If you do not have a primary care provider, please call [phone] and they will assist you.        Care EveryWhere ID     This is your Care EveryWhere ID. This could be used by other organizations to access your [facility] records  [id number]        Your Vitals Were     Pulse Temperature Height Pulse Oximetry BMI (Body Mass Index)       66 97  F (36.1  C) (Oral) 6' 1.25\" (1.861 m) 98% 28.29 " kg/m2        Blood Pressure from Last 3 Encounters:   10/01/18 114/66   07/31/18 123/75   06/15/18 117/77    Weight from Last 3 Encounters:   10/01/18 215 lb 14.4 oz (97.9 kg)   07/31/18 205 lb 12.8 oz (93.4 kg)   06/15/18 198 lb (89.8 kg)              We Performed the Following     FLU VACCINE, SPLIT VIRUS, IM (QUADRIVALENT) [40207]- >3 YRS     Vaccine Administration, Initial [03954]          Today's Medication Changes          These changes are accurate as of 10/1/18 10:28 AM.  If you have any questions, ask your nurse or doctor.               These medicines have changed or have updated prescriptions.        Dose/Directions    citalopram 20 MG tablet   Commonly known as:  celeXA   This may have changed:  additional instructions   Used for:  DAVID (generalized anxiety disorder)   Changed by:  Megan Degroot MD        Dose:  20 mg   Take 1 tablet (20 mg) by mouth daily   Quantity:  90 tablet   Refills:  3            Where to get your medicines      These medications were sent to [facility] [address] [place] [address] [address] with [facility] [address]  [address] [place] [address]     Phone:  [phone]     buPROPion 150 MG 24 hr tablet    citalopram 20 MG tablet                Primary Care Provider Office Phone # Fax #    Megan Filiberto Degroot [phone] [address]  [facility] [address]        Equal Access to Services     [facility] AH: Hadii delaney ku hadasho Sojaniaali, waaxda luqadaha, qaybta kaalmada adeegyada, comfort vizcarra. So [facility] [phone].    ATENCIÓN: Si habla español, tiene a ferreira disposición servicios gratuitos de asistencia lingüística. Llame al [phone].    We comply with applicable federal civil rights laws and [place] laws. We do not discriminate on the basis of race, color, national origin, age, disability, sex, sexual orientation, or gender identity.            Thank you!     Thank you for choosing [facility]  for your care. Our goal is always to provide you with excellent care. Hearing back from our patients is one way we can continue to improve our services. Please take a few minutes to complete the written survey that you may receive in the mail after your visit with us. Thank you!             Your Updated Medication List - Protect others around you: Learn how to safely use, store and throw away your medicines at www.disposemymeds.org.          This list is accurate as of 10/1/18 10:28 AM.  Always use your most recent med list.                   Brand Name Dispense Instructions for use Diagnosis    buPROPion 150 MG 24 hr tablet    WELLBUTRIN XL    90 tablet    Take 1 tablet (150 mg) by mouth every morning    DAVID (generalized anxiety disorder), Moderate episode of recurrent major depressive disorder (H)       citalopram 20 MG tablet    celeXA    90 tablet    Take 1 tablet (20 mg) by mouth daily    DAVID (generalized anxiety disorder)       fluticasone 50 MCG/ACT spray    FLONASE    1 Bottle    Spray 2 sprays into both nostrils daily    Nasal congestion       pramoxine 1 % foam    PROCTOFOAM    15 g    Place rectally every 2 hours as needed for hemorrhoids    Hemorrhoids, internal, with bleeding

## 2018-10-01 NOTE — PROGRESS NOTES
SUBJECTIVE:   Donte Mixon is a 32 year old male who presents to clinic today for the following health issues:    Depression and Anxiety Follow-Up    Status since last visit: Ups and down    Other associated symptoms:None    Complicating factors:     Significant life event: Yes-       Current substance abuse: None    PHQ-9 2018 6/15/2018   Total Score 15 18   Q9: Suicide Ideation Not at all Not at all   Some encounter information is confidential and restricted. Go to Review Flowsheets activity to see all data.     DAVID-7 SCORE 2018 6/15/2018   Total Score 15 15     PHQ-9  English  PHQ-9   Any Language  DAVID-7  Suicide Assessment Five-step Evaluation and Treatment (SAFE-T)    Amount of exercise or physical activity: 2 days a week    Problems taking medications regularly: No    Medication side effects: none    Diet: regular (no restrictions)      Current Medications:     Current Outpatient Prescriptions   Medication Sig Dispense Refill     buPROPion (WELLBUTRIN XL) 150 MG 24 hr tablet Take 1 tablet (150 mg) by mouth every morning 90 tablet 3     citalopram (CELEXA) 20 MG tablet Take 1 tablet (20 mg) by mouth daily 90 tablet 3     fluticasone (FLONASE) 50 MCG/ACT spray Spray 2 sprays into both nostrils daily 1 Bottle 11     pramoxine (PROCTOFOAM) 1 % foam Place rectally every 2 hours as needed for hemorrhoids 15 g 0     [DISCONTINUED] buPROPion (WELLBUTRIN XL) 150 MG 24 hr tablet Take 1 tablet (150 mg) by mouth every morning 90 tablet 1     [DISCONTINUED] citalopram (CELEXA) 20 MG tablet Take 1 tablet (20 mg) by mouth daily Begin by taking 1/2 tab nightly at bedtime for 8 days then increase to a whole tablet 90 tablet 1         Allergies:      Allergies   Allergen Reactions     Sulfa Drugs Hives            Past Medical History:     Past Medical History:   Diagnosis Date     Hoarseness     Off and on over the years.         Past Surgical History:   No past surgical history on file.      Family Medical  History:     Family History   Problem Relation Age of Onset     Coronary Artery Disease Mother      Hypertension Mother      Hyperlipidemia Mother      Cerebrovascular Disease Mother      Migraines Mother      Colon Cancer Maternal Grandmother      Other Cancer Paternal Grandmother      ? type     Diabetes Paternal Grandmother      GASTROINTESTINAL DISEASE Maternal Grandfather      diverticulitis         Social History:     Social History     Social History     Marital status:      Spouse name: N/A     Number of children: N/A     Years of education: N/A     Occupational History     Not on file.     Social History Main Topics     Smoking status: Never Smoker     Smokeless tobacco: Never Used     Alcohol use 2.4 oz/week     4 Standard drinks or equivalent per week     Drug use: No     Sexual activity: Yes     Partners: Female     Other Topics Concern      Service No     Blood Transfusions No     Caffeine Concern No     2cpd     Occupational Exposure No     Hobby Hazards No     Sleep Concern No     Stress Concern No     Weight Concern No     Special Diet No     Back Care No     Exercise Yes     4 per week crossfit and yoga     Bike Helmet Yes     NA     Seat Belt Yes     Self-Exams Yes     Social History Narrative    Lives with fiance.  Works going well.  Works in [employer]  Feels safe in environment.  Has a good support network.  Moved from [place] a year ago.    Chelsi Ruiz PA-C    11/10/2015               Review of System:     Constitutional: Negative for fever or chills  Skin: Negative for rashes  Ears/Nose/Throat: Negative for nasal congestion, sore throat  Respiratory: No shortness of breath, dyspnea on exertion, cough, or hemoptysis  Cardiovascular: Negative for chest pain  Gastrointestinal: Negative for nausea, vomiting  Genitourinary: Negative for dysuria, hematuria  Musculoskeletal: Negative for myalgias  Neurologic: Negative for headaches  Psychiatric: positive for depression,  "anxiety  Hematologic/Lymphatic/Immunologic: Negative  Endocrine: Negative  Behavioral: Negative for tobacco use       Physical Exam:   /66  Pulse 66  Temp 97  F (36.1  C) (Oral)  Ht 6' 1.25\" (1.861 m)  Wt 215 lb 14.4 oz (97.9 kg)  SpO2 98%  BMI 28.29 kg/m2    GENERAL: alert and no distress  EYES: eyes grossly normal to inspection, and conjunctivae and sclerae normal  HENT: Normocephalic atraumatic. Nose and mouth without ulcers or lesions  NECK: supple  RESP: lungs clear to auscultation   CV: regular rate and rhythm, normal S1 S2  LYMPH: no peripheral edema   ABDOMEN: nondistended  MS: no gross musculoskeletal defects noted  SKIN: no suspicious lesions or rashes  NEURO: Alert & Oriented x 3.   PSYCH: mentation appears normal, affect normal        Diagnostic Test Results:         ASSESSMENT/PLAN:     (F41.1) DAVID (generalized anxiety disorder)  (F33.1) Moderate episode of recurrent major depressive disorder (H)  (primary encounter diagnosis)  Comment: depression and anxiety symptoms are currently stable.  Plan: I have refilled the patient's buPROPion (WELLBUTRIN XL) 150 MG 24 hr tablet and citalopram (CELEXA) 20 MG tablet medications today.    (Z23) Need for prophylactic vaccination and inoculation against influenza  Comment: patient is due for a flu vaccine.  Plan: I have ordered FLU VACCINE, SPLIT VIRUS, IM (QUADRIVALENT)    [89839]- >3 YRS, Vaccine Administration, Initial [65550]            Follow Up Plan:     Patient is instructed to return to Internal Medicine clinic for follow-up visit in 6 months.        Megan Degroot MD  Internal Medicine  [facility]    "

## 2018-12-14 ENCOUNTER — TELEPHONE (OUTPATIENT)
Dept: FAMILY MEDICINE | Facility: CLINIC | Age: 33
End: 2018-12-14

## 2018-12-14 ASSESSMENT — PATIENT HEALTH QUESTIONNAIRE - PHQ9: SUM OF ALL RESPONSES TO PHQ QUESTIONS 1-9: 4

## 2018-12-14 NOTE — TELEPHONE ENCOUNTER
He is feeling very well compared to the past with his medication. Arlet Rocha, CMA on 12/14/2018 at 5:22 PM

## 2018-12-21 ENCOUNTER — TELEPHONE (OUTPATIENT)
Dept: FAMILY MEDICINE | Facility: CLINIC | Age: 33
End: 2018-12-21

## 2018-12-21 DIAGNOSIS — K92.1 BLOOD IN STOOL: Primary | ICD-10-CM

## 2018-12-21 NOTE — TELEPHONE ENCOUNTER
Reason for Call:  Other     Detailed comments: Pt was asked to follow up with Dr. Degroot if he continued to have blood in stool.  Dr. Degroot said he would send him in for a test.  He is not sure what kind of test or where he should have it done.    Phone Number Patient can be reached at: Home number on file [phone] (home)    Best Time: any    Can we leave a detailed message on this number? YES    Call taken on 12/21/2018 at 11:59 AM by Gabriela Alarcon

## 2018-12-21 NOTE — TELEPHONE ENCOUNTER
Please notify pt that colonoscopy has been ordered. GASTROENTEROLOGY ADULT REF PROCEDURE ONLY Tiffany Cha [phone];

## 2019-01-07 ENCOUNTER — MYC MEDICAL ADVICE (OUTPATIENT)
Dept: FAMILY MEDICINE | Facility: CLINIC | Age: 34
End: 2019-01-07

## 2019-01-07 NOTE — TELEPHONE ENCOUNTER
The SSRIs can cause weight gain but I personally have not found that with my patients ; and when they are also on welbutrin it is very unlikely.  I would really think about other possible factors contributing to the weight gain before discontinuing a medication that is helping.     Follow up with Dr Degorot for any discussion of medication adjustment

## 2019-01-29 ENCOUNTER — MYC REFILL (OUTPATIENT)
Dept: FAMILY MEDICINE | Facility: CLINIC | Age: 34
End: 2019-01-29

## 2019-01-29 DIAGNOSIS — F33.1 MODERATE EPISODE OF RECURRENT MAJOR DEPRESSIVE DISORDER (H): ICD-10-CM

## 2019-01-29 DIAGNOSIS — F41.1 GAD (GENERALIZED ANXIETY DISORDER): ICD-10-CM

## 2019-01-29 RX ORDER — BUPROPION HYDROCHLORIDE 150 MG/1
150 TABLET ORAL EVERY MORNING
Qty: 90 TABLET | Refills: 2 | Status: SHIPPED | OUTPATIENT
Start: 2019-01-29 | End: 2021-10-15

## 2019-01-29 RX ORDER — CITALOPRAM HYDROBROMIDE 20 MG/1
20 TABLET ORAL DAILY
Qty: 90 TABLET | Refills: 2 | Status: SHIPPED | OUTPATIENT
Start: 2019-01-29 | End: 2021-10-15

## 2019-01-29 NOTE — TELEPHONE ENCOUNTER
"buPROPion (WELLBUTRIN XL) 150 MG 24 hr tablet 90 tablet 3 10/1/2018     citalopram (CELEXA) 20 MG tablet 90 tablet 3 10/1/2018         Last Written Prescription Date:  10/01/2018  Last Fill Quantity: 90,  # refills: 3   Last office visit: 10/1/2018 with prescribing provider:     Future Office Visit:  Unknown  PHQ-9 SCORE 5/25/2018 6/15/2018 12/14/2018   PHQ-9 Total Score 15 18 4   Some encounter information is confidential and restricted. Go to Review Flowsheets activity to see all data.       Requested Prescriptions   Pending Prescriptions Disp Refills     citalopram (CELEXA) 20 MG tablet 90 tablet 3     Sig: Take 1 tablet (20 mg) by mouth daily    SSRIs Protocol Passed - 1/29/2019  7:15 AM       Passed - PHQ-9 score less than 5 in past 6 months    Please review last PHQ-9 score.          Passed - Medication is Bupropion    If the medication is Bupropion (Wellbutrin), and the patient is taking for smoking cessation; OK to refill.         Passed - Medication is active on med list       Passed - Patient is age 18 or older       Passed - Recent (6 mo) or future (30 days) visit within the authorizing provider's specialty    Patient had office visit in the last 6 months or has a visit in the next 30 days with authorizing provider or within the authorizing provider's specialty.  See \"Patient Info\" tab in inbasket, or \"Choose Columns\" in Meds & Orders section of the refill encounter.            buPROPion (WELLBUTRIN XL) 150 MG 24 hr tablet 90 tablet 3     Sig: Take 1 tablet (150 mg) by mouth every morning    SSRIs Protocol Passed - 1/29/2019  7:15 AM       Passed - PHQ-9 score less than 5 in past 6 months    Please review last PHQ-9 score.          Passed - Medication is Bupropion    If the medication is Bupropion (Wellbutrin), and the patient is taking for smoking cessation; OK to refill.         Passed - Medication is active on med list       Passed - Patient is age 18 or older       Passed - Recent (6 mo) or " "future (30 days) visit within the authorizing provider's specialty    Patient had office visit in the last 6 months or has a visit in the next 30 days with authorizing provider or within the authorizing provider's specialty.  See \"Patient Info\" tab in inbasket, or \"Choose Columns\" in Meds & Orders section of the refill encounter.              "

## 2019-02-07 ENCOUNTER — HOSPITAL ENCOUNTER (OUTPATIENT)
Facility: CLINIC | Age: 34
Discharge: HOME | End: 2019-02-07
Attending: SPECIALIST | Admitting: SPECIALIST
Payer: COMMERCIAL

## 2019-02-07 VITALS
RESPIRATION RATE: 23 BRPM | HEART RATE: 62 BPM | DIASTOLIC BLOOD PRESSURE: 68 MMHG | OXYGEN SATURATION: 98 % | HEIGHT: 74 IN | WEIGHT: 207 LBS | BODY MASS INDEX: 26.56 KG/M2 | SYSTOLIC BLOOD PRESSURE: 107 MMHG

## 2019-02-07 LAB — COLONOSCOPY: NORMAL

## 2019-02-07 PROCEDURE — 25000128 H RX IP 250 OP 636: Performed by: SPECIALIST

## 2019-02-07 PROCEDURE — 99153 MOD SED SAME PHYS/QHP EA: CPT

## 2019-02-07 PROCEDURE — G0500 MOD SEDAT ENDO SERVICE >5YRS: HCPCS | Performed by: SPECIALIST

## 2019-02-07 PROCEDURE — 45385 COLONOSCOPY W/LESION REMOVAL: CPT | Performed by: SPECIALIST

## 2019-02-07 PROCEDURE — 88305 TISSUE EXAM BY PATHOLOGIST: CPT | Performed by: SPECIALIST

## 2019-02-07 PROCEDURE — 88305 TISSUE EXAM BY PATHOLOGIST: CPT | Mod: 26 | Performed by: SPECIALIST

## 2019-02-07 RX ORDER — SODIUM CHLORIDE 9 MG/ML
INJECTION, SOLUTION INTRAVENOUS CONTINUOUS PRN
Status: DISCONTINUED | OUTPATIENT
Start: 2019-02-07 | End: 2019-02-07 | Stop reason: HOSPADM

## 2019-02-07 RX ORDER — LIDOCAINE 40 MG/G
CREAM TOPICAL
Status: DISCONTINUED | OUTPATIENT
Start: 2019-02-07 | End: 2019-02-07 | Stop reason: HOSPADM

## 2019-02-07 RX ORDER — MULTIPLE VITAMINS W/ MINERALS TAB 9MG-400MCG
1 TAB ORAL DAILY
COMMUNITY
End: 2021-10-15

## 2019-02-07 RX ORDER — FENTANYL CITRATE 50 UG/ML
INJECTION, SOLUTION INTRAMUSCULAR; INTRAVENOUS PRN
Status: DISCONTINUED | OUTPATIENT
Start: 2019-02-07 | End: 2019-02-07 | Stop reason: HOSPADM

## 2019-02-07 RX ORDER — ONDANSETRON 2 MG/ML
4 INJECTION INTRAMUSCULAR; INTRAVENOUS
Status: DISCONTINUED | OUTPATIENT
Start: 2019-02-07 | End: 2019-02-07 | Stop reason: HOSPADM

## 2019-02-07 ASSESSMENT — MIFFLIN-ST. JEOR: SCORE: 1953.7

## 2019-02-07 NOTE — H&P
Pre-Endoscopy History and Physical     Donte Mixon MRN# [id number]   YOB: 1985 Age: 33 year old     Date of Procedure: 2/7/2019  Primary care provider: Megan Degroot  Type of Endoscopy: Colonoscopy with possible biopsy, possible polypectomy  Reason for Procedure: blood on the toilet paper, blood in the stool  Type of Anesthesia Anticipated: Conscious Sedation    HPI:    Donte is a 33 year old male who will be undergoing the above procedure.      A history and physical has been performed. The patient's medications and allergies have been reviewed. The risks and benefits of the procedure and the sedation options and risks were discussed with the patient.  All questions were answered and informed consent was obtained.      He denies a personal or family history of anesthesia complications or bleeding disorders.     Patient Active Problem List   Diagnosis     Routine general medical examination at a health care facility     Testicular pain     Hemorrhoids, unspecified hemorrhoid type     Deviated nasal septum     Nasal obstruction     Hypertrophy of inferior nasal turbinate        Past Medical History:   Diagnosis Date     Anxiety      Hoarseness     Off and on over the years.     Seasonal allergies         History reviewed. No pertinent surgical history.    Social History     Tobacco Use     Smoking status: Never Smoker     Smokeless tobacco: Never Used   Substance Use Topics     Alcohol use: Yes     Alcohol/week: 2.4 oz     Types: 4 Standard drinks or equivalent per week     Comment: 4 a week       Family History   Problem Relation Age of Onset     Coronary Artery Disease Mother      Hypertension Mother      Hyperlipidemia Mother      Cerebrovascular Disease Mother      Migraines Mother      Colon Cancer Maternal Grandmother      Other Cancer Paternal Grandmother         ? type     Diabetes Paternal Grandmother      Gastrointestinal Disease Maternal Grandfather         diverticulitis       Prior to  "Admission medications    Medication Sig Start Date End Date Taking? Authorizing Provider   acetaminophen-caffeine (EXCEDRIN TENSION HEADACHE) 500-65 MG TABS Take 2 tablets by mouth every 6 hours as needed for mild pain   Yes Reported, Patient   buPROPion (WELLBUTRIN XL) 150 MG 24 hr tablet Take 1 tablet (150 mg) by mouth every morning 1/29/19  Yes Megan Degroot MD   citalopram (CELEXA) 20 MG tablet Take 1 tablet (20 mg) by mouth daily 1/29/19  Yes Megan Degroot MD   fluticasone (FLONASE) 50 MCG/ACT nasal spray Spray 2 sprays into both nostrils daily 2/4/19  Yes Ramya Simon MD   multivitamin w/minerals (MULTI-VITAMIN) tablet Take 1 tablet by mouth daily   Yes Reported, Patient   doxycycline (VIBRAMYCIN) 100 MG capsule Take 1 capsule (100 mg) by mouth 2 times daily for 7 days 3/19/18 3/26/18  Megan Degroot MD   pramoxine (PROCTOFOAM) 1 % foam Place rectally every 2 hours as needed for hemorrhoids 5/25/18   Leslie Hearn APRN CNP       Allergies   Allergen Reactions     Sulfa Drugs Hives        REVIEW OF SYSTEMS:   5 point ROS negative except as noted above in HPI, including Gen., Resp., CV, GI &  system review.    PHYSICAL EXAM:   /70   Resp 16   Ht 1.88 m (6' 2\")   Wt 93.9 kg (207 lb)   SpO2 99%   BMI 26.58 kg/m   Estimated body mass index is 26.58 kg/m  as calculated from the following:    Height as of this encounter: 1.88 m (6' 2\").    Weight as of this encounter: 93.9 kg (207 lb).   GENERAL APPEARANCE: alert, and oriented  MENTAL STATUS: alert  AIRWAY EXAM: Mallampatti Class II (visualization of the soft palate, fauces, and uvula)  RESP: lungs clear to auscultation - no rales, rhonchi or wheezes  CV: regular rates and rhythm  DIAGNOSTICS:    Not indicated    IMPRESSION   ASA Class 2 - Mild systemic disease    PLAN:   Plan for Colonoscopy with possible biopsy, possible polypectomy. We discussed the risks, benefits and alternatives and the patient wished to " proceed.    The above has been forwarded to the consulting provider.      Signed Electronically by: Jairo Flores  February 7, 2019

## 2019-02-08 LAB — COPATH REPORT: NORMAL

## 2019-04-04 ENCOUNTER — OFFICE VISIT (OUTPATIENT)
Dept: FAMILY MEDICINE | Facility: CLINIC | Age: 34
End: 2019-04-04
Payer: COMMERCIAL

## 2019-04-04 VITALS
DIASTOLIC BLOOD PRESSURE: 75 MMHG | SYSTOLIC BLOOD PRESSURE: 111 MMHG | OXYGEN SATURATION: 97 % | HEIGHT: 74 IN | TEMPERATURE: 97.1 F | HEART RATE: 70 BPM | WEIGHT: 212 LBS | BODY MASS INDEX: 27.21 KG/M2

## 2019-04-04 DIAGNOSIS — R07.0 THROAT PAIN: ICD-10-CM

## 2019-04-04 DIAGNOSIS — R09.81 NASAL CONGESTION: ICD-10-CM

## 2019-04-04 DIAGNOSIS — J06.9 VIRAL URI WITH COUGH: Primary | ICD-10-CM

## 2019-04-04 PROCEDURE — 99213 OFFICE O/P EST LOW 20 MIN: CPT | Performed by: NURSE PRACTITIONER

## 2019-04-04 RX ORDER — FLUTICASONE PROPIONATE 50 MCG
2 SPRAY, SUSPENSION (ML) NASAL DAILY
Qty: 15.8 ML | Refills: 11 | Status: SHIPPED | OUTPATIENT
Start: 2019-04-04 | End: 2021-10-15

## 2019-04-04 ASSESSMENT — ANXIETY QUESTIONNAIRES
2. NOT BEING ABLE TO STOP OR CONTROL WORRYING: SEVERAL DAYS
GAD7 TOTAL SCORE: 9
7. FEELING AFRAID AS IF SOMETHING AWFUL MIGHT HAPPEN: SEVERAL DAYS
IF YOU CHECKED OFF ANY PROBLEMS ON THIS QUESTIONNAIRE, HOW DIFFICULT HAVE THESE PROBLEMS MADE IT FOR YOU TO DO YOUR WORK, TAKE CARE OF THINGS AT HOME, OR GET ALONG WITH OTHER PEOPLE: SOMEWHAT DIFFICULT
1. FEELING NERVOUS, ANXIOUS, OR ON EDGE: SEVERAL DAYS
5. BEING SO RESTLESS THAT IT IS HARD TO SIT STILL: SEVERAL DAYS
3. WORRYING TOO MUCH ABOUT DIFFERENT THINGS: MORE THAN HALF THE DAYS
6. BECOMING EASILY ANNOYED OR IRRITABLE: MORE THAN HALF THE DAYS

## 2019-04-04 ASSESSMENT — MIFFLIN-ST. JEOR: SCORE: 1976.38

## 2019-04-04 ASSESSMENT — PATIENT HEALTH QUESTIONNAIRE - PHQ9
5. POOR APPETITE OR OVEREATING: SEVERAL DAYS
SUM OF ALL RESPONSES TO PHQ QUESTIONS 1-9: 9

## 2019-04-04 NOTE — PROGRESS NOTES
HPI    SUBJECTIVE:   Donte Mixon is a 33 year old male who presents to clinic today for the following health issues:  Chief Complaint   Patient presents with     URI     had cold 2 weeks ago. Back from [place] Sunday severe sore throat, dry wheezy cough, fatigue.  Has a 9 mnth old at home        First started a couple weeks ago. Didn't feel terrible but had a productive cough.   Symptoms improved and flew to [facility] over the weekend   Went to  in [facility] for 9 month old that was sick with URI  On the flight home started feeling ill and then this week started again   Tickle in throat, cough that is a little wheezy   Has family coming into town this weekend so wants to get checked out   Significant fatigue     Past Medical History:   Diagnosis Date     Anxiety      Hoarseness     Off and on over the years.     Seasonal allergies      Family History   Problem Relation Age of Onset     Coronary Artery Disease Mother      Hypertension Mother      Hyperlipidemia Mother      Cerebrovascular Disease Mother      Migraines Mother      Colon Cancer Maternal Grandmother      Other Cancer Paternal Grandmother         ? type     Diabetes Paternal Grandmother      Gastrointestinal Disease Maternal Grandfather         diverticulitis     No past surgical history on file.  Social History     Tobacco Use     Smoking status: Never Smoker     Smokeless tobacco: Never Used   Substance Use Topics     Alcohol use: Yes     Alcohol/week: 2.4 oz     Types: 4 Standard drinks or equivalent per week     Comment: 4 a week average      Current Outpatient Medications   Medication Sig Dispense Refill     acetaminophen-caffeine (EXCEDRIN TENSION HEADACHE) 500-65 MG TABS Take 2 tablets by mouth every 6 hours as needed for mild pain       buPROPion (WELLBUTRIN XL) 150 MG 24 hr tablet Take 1 tablet (150 mg) by mouth every morning 90 tablet 2     citalopram (CELEXA) 20 MG tablet Take 1 tablet (20 mg) by mouth daily 90 tablet 2     fluticasone (FLONASE) 50  "MCG/ACT nasal spray Spray 2 sprays into both nostrils daily 1 Bottle 11     multivitamin w/minerals (MULTI-VITAMIN) tablet Take 1 tablet by mouth daily       Allergies   Allergen Reactions     Sulfa Drugs Hives       Reviewed and updated as needed this visit by clinical staff and provider     ROS  Detailed as above     /75 (BP Location: Left arm, Patient Position: Chair, Cuff Size: Adult Large)   Pulse 70   Temp 97.1  F (36.2  C) (Tympanic)   Ht 1.88 m (6' 2\")   Wt 96.2 kg (212 lb)   SpO2 97%   BMI 27.22 kg/m     Physical Exam   Constitutional: He is oriented to person, place, and time. He appears well-developed.   HENT:   Head: Normocephalic.   Right Ear: Tympanic membrane, external ear and ear canal normal.   Left Ear: Tympanic membrane, external ear and ear canal normal.   Nose: Mucosal edema and rhinorrhea present.   Mouth/Throat: Oropharynx is clear and moist. No oropharyngeal exudate.   Eyes: Conjunctivae are normal.   Neck: Normal range of motion.   Cardiovascular: Normal rate, regular rhythm and normal heart sounds.   No murmur heard.  Pulmonary/Chest: Effort normal and breath sounds normal. No respiratory distress.   Musculoskeletal: Normal range of motion.   Lymphadenopathy:     He has no cervical adenopathy.   Neurological: He is alert and oriented to person, place, and time.   Skin: Skin is warm and dry.   Psychiatric: He has a normal mood and affect. Judgment normal.       Assessment and Plan:       ICD-10-CM    1. Viral URI with cough J06.9     B97.89    2. Throat pain R07.0    3. Nasal congestion R09.81 fluticasone (FLONASE) 50 MCG/ACT nasal spray     Suspect viral etiology today. Recommend Symptomatic treatments such as saline nasal spray, mucinex DM, or other OTC cold meds. Refilled flonase. Follow-up or call with worsening symptoms.     Taylor Vyas, APRN, CNP  [facility]   "

## 2019-04-05 ASSESSMENT — ANXIETY QUESTIONNAIRES: GAD7 TOTAL SCORE: 9

## 2020-03-10 ENCOUNTER — HEALTH MAINTENANCE LETTER (OUTPATIENT)
Age: 35
End: 2020-03-10

## 2020-12-27 ENCOUNTER — HEALTH MAINTENANCE LETTER (OUTPATIENT)
Age: 35
End: 2020-12-27

## 2021-04-24 ENCOUNTER — HEALTH MAINTENANCE LETTER (OUTPATIENT)
Age: 36
End: 2021-04-24

## 2021-10-04 ENCOUNTER — HEALTH MAINTENANCE LETTER (OUTPATIENT)
Age: 36
End: 2021-10-04

## 2021-10-12 ENCOUNTER — PRE VISIT (OUTPATIENT)
Dept: UROLOGY | Facility: CLINIC | Age: 36
End: 2021-10-12

## 2021-10-12 NOTE — TELEPHONE ENCOUNTER
MEDICAL RECORDS REQUEST   [facility]  Urology Clinic  [address] [place] [address]  PHONE: [phone]  Fax: [phone]        FUTURE VISIT INFORMATION                                                   Donte Mixon, : 1985 scheduled for future visit at [facility]    APPOINTMENT INFORMATION:    Date: 10/15/202    Provider:  Yola Anderson PA    Reason for Visit/Diagnosis: testicular pain    REFERRAL INFORMATION:    Referring provider:  N/A    Specialty: N/A    Referring providers clinic:  N/A    Clinic contact number:  N/A    RECORDS REQUESTED FOR VISIT                                                     NOTES  STATUS/DETAILS   OFFICE NOTE from referring provider  no   OFFICE NOTE from other specialist  yes, 10/01/2021 -- Marcello Bennett MD -- [facility]   DISCHARGE SUMMARY from hospital  no   DISCHARGE REPORT from the ER  no   OPERATIVE REPORT  no   MEDICATION LIST  yes   LABS     URINALYSIS (UA)  yes,    URINE CYTOLOGY  no     PRE-VISIT CHECKLIST      Record collection complete Yes   Appointment appropriately scheduled           (right time/right provider) Yes   Joint diagnostic appointment coordinated correctly          (ensure right order & amount of time) Yes   MyChart activation Yes   Questionnaire complete If no, please explain pending

## 2021-10-13 NOTE — TELEPHONE ENCOUNTER
Reason for visit: consult     Relevant information: intermittent testicular pain for past 2 weeks    Problem list   Patient Active Problem List   Diagnosis     Routine general medical examination at a health care facility     Testicular pain     Hemorrhoids, unspecified hemorrhoid type     Deviated nasal septum     Nasal obstruction     Hypertrophy of inferior nasal turbinate       Records/imaging/labs/orders: labs available    Pt called: n/a    At Rooming: virtual

## 2021-10-15 ENCOUNTER — VIRTUAL VISIT (OUTPATIENT)
Dept: UROLOGY | Facility: CLINIC | Age: 36
End: 2021-10-15
Payer: COMMERCIAL

## 2021-10-15 ENCOUNTER — LAB (OUTPATIENT)
Dept: LAB | Facility: CLINIC | Age: 36
End: 2021-10-15
Payer: COMMERCIAL

## 2021-10-15 VITALS — HEIGHT: 74 IN | WEIGHT: 215 LBS | BODY MASS INDEX: 27.59 KG/M2

## 2021-10-15 DIAGNOSIS — N41.0 ACUTE PROSTATITIS: ICD-10-CM

## 2021-10-15 DIAGNOSIS — N41.0 ACUTE PROSTATITIS: Primary | ICD-10-CM

## 2021-10-15 LAB
ALBUMIN UR-MCNC: NEGATIVE MG/DL
APPEARANCE UR: CLEAR
BACTERIA #/AREA URNS HPF: ABNORMAL /HPF
BILIRUB UR QL STRIP: NEGATIVE
COLOR UR AUTO: YELLOW
GLUCOSE UR STRIP-MCNC: NEGATIVE MG/DL
HGB UR QL STRIP: NEGATIVE
KETONES UR STRIP-MCNC: NEGATIVE MG/DL
LEUKOCYTE ESTERASE UR QL STRIP: NEGATIVE
NITRATE UR QL: NEGATIVE
PH UR STRIP: 6 [PH] (ref 5–7)
RBC #/AREA URNS AUTO: ABNORMAL /HPF
SP GR UR STRIP: 1.01 (ref 1–1.03)
SPERM #/AREA URNS HPF: PRESENT /HPF
UROBILINOGEN UR STRIP-ACNC: 0.2 E.U./DL
WBC #/AREA URNS AUTO: ABNORMAL /HPF

## 2021-10-15 PROCEDURE — 99203 OFFICE O/P NEW LOW 30 MIN: CPT | Mod: GT | Performed by: PHYSICIAN ASSISTANT

## 2021-10-15 PROCEDURE — 87086 URINE CULTURE/COLONY COUNT: CPT

## 2021-10-15 PROCEDURE — 81001 URINALYSIS AUTO W/SCOPE: CPT

## 2021-10-15 RX ORDER — NAPROXEN 250 MG/1
TABLET ORAL 2 TIMES DAILY WITH MEALS
COMMUNITY

## 2021-10-15 RX ORDER — ATOMOXETINE 25 MG/1
CAPSULE ORAL
COMMUNITY
Start: 2021-09-24

## 2021-10-15 RX ORDER — MULTIVITAMIN,THERAPEUTIC
1 TABLET ORAL DAILY
COMMUNITY

## 2021-10-15 RX ORDER — CIPROFLOXACIN 500 MG/1
500 TABLET, FILM COATED ORAL 2 TIMES DAILY
Qty: 56 TABLET | Refills: 0 | Status: SHIPPED | OUTPATIENT
Start: 2021-10-15 | End: 2021-11-12

## 2021-10-15 ASSESSMENT — MIFFLIN-ST. JEOR: SCORE: 1974.98

## 2021-10-15 ASSESSMENT — PAIN SCALES - GENERAL: PAINLEVEL: MODERATE PAIN (5)

## 2021-10-15 NOTE — PATIENT INSTRUCTIONS
UROLOGY CLINIC VISIT PATIENT INSTRUCTIONS    - Make a lab only appointment at [facility] lab to leave a urine sample.  I placed orders for a urinalysis and urine culture to be obtained.   - After you have left a urine sample, start Ciprofloxacin 500 mg twice daily for 28 days to treat for acute prostatitis.    - If symptoms fully resolve on antibiotic therapy and do not recur once antibiotics are completed, plan to follow up as needed.  If symptoms do not improve, or change or worsen at any time, please let me know and we'll plan for another visit to evaluate your symptoms further.     If you have any issues, questions or concerns in the meantime, do not hesitate to contact us at [phone] or via [email or web address].     It was a pleasure meeting with you today.  Thank you for allowing me and my team the privilege of caring for you today.  YOU are the reason we are here, and I truly hope we provided you with the excellent service you deserve.  Please let us know if there is anything else we can do for you so that we can be sure you are leaving completely satisfied with your care experience.    Yola Anderson PA-C  Department of Urology

## 2021-10-15 NOTE — NURSING NOTE
"Chief Complaint   Patient presents with     Consult     Intermittent testicular pain for 2 weeks       Height 1.88 m (6' 2\"), weight 97.5 kg (215 lb). Body mass index is 27.6 kg/m .    Patient Active Problem List   Diagnosis     Routine general medical examination at a health care facility     Testicular pain     Hemorrhoids, unspecified hemorrhoid type     Deviated nasal septum     Nasal obstruction     Hypertrophy of inferior nasal turbinate       Allergies   Allergen Reactions     Sulfa Drugs Hives       Current Outpatient Medications   Medication Sig Dispense Refill     atomoxetine (STRATTERA) 25 MG capsule        multivitamin, therapeutic (THERA-VIT) TABS tablet Take 1 tablet by mouth daily       naproxen (NAPROSYN) 250 MG tablet Take by mouth 2 times daily (with meals)         Social History     Tobacco Use     Smoking status: Never Smoker     Smokeless tobacco: Never Used   Substance Use Topics     Alcohol use: Yes     Alcohol/week: 4.0 standard drinks     Types: 4 Standard drinks or equivalent per week     Comment: 4 a week average      Drug use: No       Kelly Mariano, AMERICO  10/15/2021  10:58 AM  "

## 2021-10-15 NOTE — PROGRESS NOTES
Donte is a 36 year old who is being evaluated via a billable video visit.      How would you like to obtain your AVS? Mail a copy  If the video visit is dropped, the invitation should be resent by: Send to e-mail at: [email or web address]  Will anyone else be joining your video visit? No      Video Start Time: 11:10 AM  Video-Visit Details    Type of service:  Video Visit    Video End Time:11:32 AM    Originating Location (pt. Location): Work    Distant Location (provider location):  [facility] [place]     Platform used for Video Visit: [email or web address]

## 2021-10-15 NOTE — LETTER
10/15/2021       RE: Donte Mixon  [address]  [facility] [address]     Dear Colleague,    Thank you for referring your patient, Donte Mixon, to the [facility] [place] at [facility]. Please see a copy of my visit note below.    Donte is a 36 year old who is being evaluated via a billable video visit.      How would you like to obtain your AVS? Mail a copy  If the video visit is dropped, the invitation should be resent by: Send to e-mail at: [email or web address]  Will anyone else be joining your video visit? No      Video Start Time: 11:10 AM  Video-Visit Details    Type of service:  Video Visit    Video End Time:11:32 AM    Originating Location (pt. Location): Work    Distant Location (provider location):  [facility] [place]     Platform used for Video Visit: [email or web address]          Chief Complaint:   Perineal pain, dysuria         History of Present Illness:   Donte Mixon is a 36 year old male presents for evaluation of testicular pain.  The patient was recently seen by his PCP on 10/1/2021 for a one week history of left testicular and lower abdominal pain.  He also noted some urethral discharge, and urinary hesitancy.  Gonorrhea and chlamydia screening was negative for infection, and he was started on a 10 day course of doxycycline for treatment of urethritis; testicular exam on that date was normal with no significant abnormalities.      Since that time, the patient reports he completed the full course of doxycycline and has not noticed any change in symptoms.  He continues to have pain and discomfort, which he describes as being behind the left testicle in the perineum area.  He states this pain is worse when he is sitting, and he feels like he is sitting on a ball.  He also endorses urinary symptoms of hesitancy and dysuria.  He reports noticing what looks like ejaculation fluid in his urine.  Of note, he reports  that he had gone for a 30-40 minute bike ride a few days before symptoms all started.  He denies any concern for STD.           Past Medical History:     Past Medical History:   Diagnosis Date     Anxiety      Hoarseness     Off and on over the years.     Seasonal allergies             Past Surgical History:   No past surgical history on file.         Medications     Current Outpatient Medications   Medication     atomoxetine (STRATTERA) 25 MG capsule     multivitamin, therapeutic (THERA-VIT) TABS tablet     naproxen (NAPROSYN) 250 MG tablet     No current facility-administered medications for this visit.            Family History:     Family History   Problem Relation Age of Onset     Coronary Artery Disease Mother      Hypertension Mother      Hyperlipidemia Mother      Cerebrovascular Disease Mother      Migraines Mother      Colon Cancer Maternal Grandmother      Other Cancer Paternal Grandmother         ? type     Diabetes Paternal Grandmother      Gastrointestinal Disease Maternal Grandfather         diverticulitis            Social History:     Social History     Socioeconomic History     Marital status:      Spouse name: Not on file     Number of children: Not on file     Years of education: Not on file     Highest education level: Not on file   Occupational History     Not on file   Tobacco Use     Smoking status: Never Smoker     Smokeless tobacco: Never Used   Substance and Sexual Activity     Alcohol use: Yes     Alcohol/week: 4.0 standard drinks     Types: 4 Standard drinks or equivalent per week     Comment: 4 a week average      Drug use: No     Sexual activity: Yes     Partners: Female   Other Topics Concern      Service No     Blood Transfusions No     Caffeine Concern No     Comment: 2cpd     Occupational Exposure No     Hobby Hazards No     Sleep Concern No     Stress Concern No     Weight Concern No     Special Diet No     Back Care No     Exercise Yes     Comment: 4 per week  "crossfit and yoga     Bike Helmet Yes     Comment: NA     Seat Belt Yes     Self-Exams Yes     Parent/sibling w/ CABG, MI or angioplasty before 65F 55M? Not Asked   Social History Narrative    Lives with fiance.  Works going well.  Works in insurance.  Feels safe in environment.  Has a good support network.  Moved from [place] a year ago.    Chelsi Ruiz PA-C    11/10/2015     Social Determinants of Health     Financial Resource Strain:      Difficulty of Paying Living Expenses:    Food Insecurity:      Worried About Running Out of Food in the Last Year:      Ran Out of Food in the Last Year:    Transportation Needs:      Lack of Transportation (Medical):      Lack of Transportation (Non-Medical):    Physical Activity:      Days of Exercise per Week:      Minutes of Exercise per Session:    Stress:      Feeling of Stress :    Social Connections:      Frequency of Communication with Friends and Family:      Frequency of Social Gatherings with Friends and Family:      Attends Congregation Services:      Active Member of Clubs or Organizations:      Attends Club or Organization Meetings:      Marital Status:    Intimate Partner Violence:      Fear of Current or Ex-Partner:      Emotionally Abused:      Physically Abused:      Sexually Abused:           Allergies:   Sulfa drugs         Review of Systems:  From intake questionnaire   Negative 14 system review except as noted on HPI, nurse's note.         Physical Exam:   Patient is a 36 year old  male   Vitals: Height 1.88 m (6' 2\"), weight 97.5 kg (215 lb).  General Appearance Adult: Alert, no acute distress, oriented  Lungs: no respiratory distress, or pursed lip breathing  Heart: No obvious jugular venous distension present  Skin: no suspicious lesions or rashes  Neuro: Alert, oriented, speech and mentation normal  Further examination is deferred due to the nature of our visit.       Labs and Pathology:    I personally reviewed all applicable laboratory data and went " "over findings with patient  Significant for:    Chlamydia and gonorrhea screen (10/1/2021):   Ref Range & Units 2 wk ago   CHLAMYDIA PROBE \" \"  Negative    N GONORRHOEAE PROBE \" \"  Negative      CBC (10/21/2021):   Ref Range & Units 2 wk ago   WHITE BLOOD COUNT         4.5 - 11.0 thou/cu mm 6.1    RED BLOOD COUNT           4.30 - 5.90 mil/cu mm 4.95    HEMOGLOBIN                13.5 - 17.5 g/dL 14.9    HEMATOCRIT                37.0 - 53.0 % 43.8    MCV                       80 - 100 fL 89    MCH                       26.0 - 34.0 pg 30.1    MCHC                      32.0 - 36.0 g/dL 34.0    RDW                       11.5 - 15.5 % 11.8    PLATELET COUNT            140 - 440 thou/cu mm 249    MPV                       6.5 - 11.0 fL 9.5    NEUTROPHILS                % 45.9    LYMPHOCYTES                % 42.3    MONOCYTES                  % 10.2    EOSINOPHILS                % 1.3    BASOPHILS                  % 0.3    ABSOLUTE NEUTROPHILS      1.7 - 7.0 thou/cu mm 2.8    ABSOLUTE LYMPHOCYTES      0.9 - 2.9 thou/cu mm 2.6    ABSOLUTE MONOCYTES        <0.9 thou/cu mm 0.6    ABSOLUTE EOSINOPHILS      <0.5 thou/cu mm 0.1    ABSOLUTE BASOPHILS        <0.3 thou/cu mm 0.0          Imaging:    I personally reviewed all applicable imaging and went over findings with patient.  Significant for:    Results for orders placed or performed during the hospital encounter of 12/17/15   US Scrotum and Testicles    Narrative    EXAMINATION: US TESTICULAR AND SCROTUM  12/17/2015 8:02 AM      CLINICAL HISTORY: Occasional left shoulder pain for a year      COMPARISON: None.        PROCEDURE COMMENTS: Ultrasound of the scrotum was performed with color  and spectral Doppler.    FINDINGS:  Right testis: 5.1 x 2.8 x 3.3 cm, volume of 24.6 mL.  Left testis: 5.0 x 2.8 x 2.9 cm, volume of 21.2 mL.    The testes are normal in size, shape, and echotexture, and are located  within the scrotum. Few scattered left testicular microlithiasis. "     Prominent right epididymal cyst 1.3 x 0.8 x 1.1 cm. Two small left  epididymal cysts.There is no hydrocele or varicocele.    There is normal testicular blood flow as documented by both color  Doppler evaluation and spectral Doppler waveforms.  There is no  evidence of testicular torsion.      Impression    IMPRESSION: Unremarkable scrotal ultrasound. Few scattered left  testicular microlithiasis.     I have personally reviewed the examination and initial interpretation  and I agree with the findings.    KRIS VILLAGRAN MD            Assessment and Plan:     Assessment: 36 year old male with suspect acute prostatitis given symptoms of perineal pain, dysuria, urinary hesitancy, and cloudy urine.  Recent treatment with doxycycline for urethritis did not significantly change symptoms.  Will plan to obtain a UA/UC, and then start treatment with Ciprofloxacin 500 mg BID x 28 days for treatment of prostatitis.  Will modify treatment depending on UA/UC results if needed.  Patient advised to follow up in clinic should symptoms fail to improve on antibiotic therapy or worsen at any time.      Plan:  - Obtain UA/UC.   - Start Ciprofloxacin 500 mg twice daily for 28 days to treat for acute prostatitis.  Initiate antibiotic therapy AFTER urine collection for UA/UC.   - If symptoms fully resolve on antibiotic therapy and do not recur once antibiotics are completed, plan to follow up as needed.  If symptoms do not improve, or worsen at any time, will plan for follow up visit in person.     VICK Prater  Department of Urology

## 2021-10-15 NOTE — PROGRESS NOTES
Chief Complaint:   Perineal pain, dysuria         History of Present Illness:   Donte Mixon is a 36 year old male presents for evaluation of testicular pain.  The patient was recently seen by his PCP on 10/1/2021 for a one week history of left testicular and lower abdominal pain.  He also noted some urethral discharge, and urinary hesitancy.  Gonorrhea and chlamydia screening was negative for infection, and he was started on a 10 day course of doxycycline for treatment of urethritis; testicular exam on that date was normal with no significant abnormalities.      Since that time, the patient reports he completed the full course of doxycycline and has not noticed any change in symptoms.  He continues to have pain and discomfort, which he describes as being behind the left testicle in the perineum area.  He states this pain is worse when he is sitting, and he feels like he is sitting on a ball.  He also endorses urinary symptoms of hesitancy and dysuria.  He reports noticing what looks like ejaculation fluid in his urine.  Of note, he reports that he had gone for a 30-40 minute bike ride a few days before symptoms all started.  He denies any concern for STD.           Past Medical History:     Past Medical History:   Diagnosis Date     Anxiety      Hoarseness     Off and on over the years.     Seasonal allergies             Past Surgical History:   No past surgical history on file.         Medications     Current Outpatient Medications   Medication     atomoxetine (STRATTERA) 25 MG capsule     multivitamin, therapeutic (THERA-VIT) TABS tablet     naproxen (NAPROSYN) 250 MG tablet     No current facility-administered medications for this visit.            Family History:     Family History   Problem Relation Age of Onset     Coronary Artery Disease Mother      Hypertension Mother      Hyperlipidemia Mother      Cerebrovascular Disease Mother      Migraines Mother      Colon Cancer Maternal Grandmother      Other  Cancer Paternal Grandmother         ? type     Diabetes Paternal Grandmother      Gastrointestinal Disease Maternal Grandfather         diverticulitis            Social History:     Social History     Socioeconomic History     Marital status:      Spouse name: Not on file     Number of children: Not on file     Years of education: Not on file     Highest education level: Not on file   Occupational History     Not on file   Tobacco Use     Smoking status: Never Smoker     Smokeless tobacco: Never Used   Substance and Sexual Activity     Alcohol use: Yes     Alcohol/week: 4.0 standard drinks     Types: 4 Standard drinks or equivalent per week     Comment: 4 a week average      Drug use: No     Sexual activity: Yes     Partners: Female   Other Topics Concern      Service No     Blood Transfusions No     Caffeine Concern No     Comment: 2cpd     Occupational Exposure No     Hobby Hazards No     Sleep Concern No     Stress Concern No     Weight Concern No     Special Diet No     Back Care No     Exercise Yes     Comment: 4 per week crossfit and yoga     Bike Helmet Yes     Comment: NA     Seat Belt Yes     Self-Exams Yes     Parent/sibling w/ CABG, MI or angioplasty before 65F 55M? Not Asked   Social History Narrative    Lives with fiance.  Works going well.  Works in [employer].  Feels safe in environment.  Has a good support network.  Moved from [place] a year ago.    Chelsi Ruiz PA-C    11/10/2015     Social Determinants of Health     Financial Resource Strain:      Difficulty of Paying Living Expenses:    Food Insecurity:      Worried About Running Out of Food in the Last Year:      Ran Out of Food in the Last Year:    Transportation Needs:      Lack of Transportation (Medical):      Lack of Transportation (Non-Medical):    Physical Activity:      Days of Exercise per Week:      Minutes of Exercise per Session:    Stress:      Feeling of Stress :    Social Connections:      Frequency of Communication  "with Friends and Family:      Frequency of Social Gatherings with Friends and Family:      Attends Denominational Services:      Active Member of Clubs or Organizations:      Attends Club or Organization Meetings:      Marital Status:    Intimate Partner Violence:      Fear of Current or Ex-Partner:      Emotionally Abused:      Physically Abused:      Sexually Abused:             Allergies:   Sulfa drugs         Review of Systems:  From intake questionnaire   Negative 14 system review except as noted on HPI, nurse's note.         Physical Exam:   Patient is a 36 year old  male   Vitals: Height 1.88 m (6' 2\"), weight 97.5 kg (215 lb).  General Appearance Adult: Alert, no acute distress, oriented  Lungs: no respiratory distress, or pursed lip breathing  Heart: No obvious jugular venous distension present  Skin: no suspicious lesions or rashes  Neuro: Alert, oriented, speech and mentation normal  Further examination is deferred due to the nature of our visit.       Labs and Pathology:    I personally reviewed all applicable laboratory data and went over findings with patient  Significant for:    Chlamydia and gonorrhea screen (10/1/2021):   Ref Range & Units 2 wk ago   CHLAMYDIA PROBE \" \"  Negative    N GONORRHOEAE PROBE \" \"  Negative      CBC (10/21/2021):   Ref Range & Units 2 wk ago   WHITE BLOOD COUNT         4.5 - 11.0 thou/cu mm 6.1    RED BLOOD COUNT           4.30 - 5.90 mil/cu mm 4.95    HEMOGLOBIN                13.5 - 17.5 g/dL 14.9    HEMATOCRIT                37.0 - 53.0 % 43.8    MCV                       80 - 100 fL 89    MCH                       26.0 - 34.0 pg 30.1    MCHC                      32.0 - 36.0 g/dL 34.0    RDW                       11.5 - 15.5 % 11.8    PLATELET COUNT            140 - 440 thou/cu mm 249    MPV                       6.5 - 11.0 fL 9.5    NEUTROPHILS                % 45.9    LYMPHOCYTES                % 42.3    MONOCYTES                  % 10.2    EOSINOPHILS                % 1.3  "   BASOPHILS                  % 0.3    ABSOLUTE NEUTROPHILS      1.7 - 7.0 thou/cu mm 2.8    ABSOLUTE LYMPHOCYTES      0.9 - 2.9 thou/cu mm 2.6    ABSOLUTE MONOCYTES        <0.9 thou/cu mm 0.6    ABSOLUTE EOSINOPHILS      <0.5 thou/cu mm 0.1    ABSOLUTE BASOPHILS        <0.3 thou/cu mm 0.0            Imaging:    I personally reviewed all applicable imaging and went over findings with patient.  Significant for:    Results for orders placed or performed during the hospital encounter of 12/17/15   US Scrotum and Testicles    Narrative    EXAMINATION: US TESTICULAR AND SCROTUM  12/17/2015 8:02 AM      CLINICAL HISTORY: Occasional left shoulder pain for a year      COMPARISON: None.        PROCEDURE COMMENTS: Ultrasound of the scrotum was performed with color  and spectral Doppler.    FINDINGS:  Right testis: 5.1 x 2.8 x 3.3 cm, volume of 24.6 mL.  Left testis: 5.0 x 2.8 x 2.9 cm, volume of 21.2 mL.    The testes are normal in size, shape, and echotexture, and are located  within the scrotum. Few scattered left testicular microlithiasis.     Prominent right epididymal cyst 1.3 x 0.8 x 1.1 cm. Two small left  epididymal cysts.There is no hydrocele or varicocele.    There is normal testicular blood flow as documented by both color  Doppler evaluation and spectral Doppler waveforms.  There is no  evidence of testicular torsion.      Impression    IMPRESSION: Unremarkable scrotal ultrasound. Few scattered left  testicular microlithiasis.     I have personally reviewed the examination and initial interpretation  and I agree with the findings.    KRIS VILLAGRAN MD            Assessment and Plan:     Assessment: 36 year old male with suspect acute prostatitis given symptoms of perineal pain, dysuria, urinary hesitancy, and cloudy urine.  Recent treatment with doxycycline for urethritis did not significantly change symptoms.  Will plan to obtain a UA/UC, and then start treatment with Ciprofloxacin 500 mg BID x 28 days for treatment  of prostatitis.  Will modify treatment depending on UA/UC results if needed.  Patient advised to follow up in clinic should symptoms fail to improve on antibiotic therapy or worsen at any time.      Plan:  - Obtain UA/UC.   - Start Ciprofloxacin 500 mg twice daily for 28 days to treat for acute prostatitis.  Initiate antibiotic therapy AFTER urine collection for UA/UC.   - If symptoms fully resolve on antibiotic therapy and do not recur once antibiotics are completed, plan to follow up as needed.  If symptoms do not improve, or worsen at any time, will plan for follow up visit in person.       VICK Prater  Department of Urology

## 2021-10-16 LAB — BACTERIA UR CULT: NORMAL

## 2021-10-24 ENCOUNTER — PRE VISIT (OUTPATIENT)
Dept: UROLOGY | Facility: CLINIC | Age: 36
End: 2021-10-24

## 2021-10-24 NOTE — TELEPHONE ENCOUNTER
Reason for visit: follow-up medication check     Relevant information: acute prostatitis    Problem list   Patient Active Problem List   Diagnosis     Routine general medical examination at a health care facility     Testicular pain     Hemorrhoids, unspecified hemorrhoid type     Deviated nasal septum     Nasal obstruction     Hypertrophy of inferior nasal turbinate       Records/imaging/labs/orders: in epic    Pt called: n/a    At Rooming: standard

## 2021-10-25 ENCOUNTER — OFFICE VISIT (OUTPATIENT)
Dept: UROLOGY | Facility: CLINIC | Age: 36
End: 2021-10-25
Payer: COMMERCIAL

## 2021-10-25 VITALS
WEIGHT: 215 LBS | HEART RATE: 73 BPM | HEIGHT: 74 IN | SYSTOLIC BLOOD PRESSURE: 123 MMHG | BODY MASS INDEX: 27.59 KG/M2 | DIASTOLIC BLOOD PRESSURE: 76 MMHG

## 2021-10-25 DIAGNOSIS — N41.0 ACUTE PROSTATITIS: Primary | ICD-10-CM

## 2021-10-25 DIAGNOSIS — R10.2 PERINEAL PAIN: ICD-10-CM

## 2021-10-25 DIAGNOSIS — R30.0 DYSURIA: ICD-10-CM

## 2021-10-25 PROCEDURE — 99213 OFFICE O/P EST LOW 20 MIN: CPT | Performed by: PHYSICIAN ASSISTANT

## 2021-10-25 ASSESSMENT — PAIN SCALES - GENERAL: PAINLEVEL: MILD PAIN (2)

## 2021-10-25 ASSESSMENT — MIFFLIN-ST. JEOR: SCORE: 1974.98

## 2021-10-25 NOTE — PROGRESS NOTES
Chief Complaint:   Follow up          History of Present Illness:   Donte Mixon is a 36 year old male with a history of perineal and left testicular pain which has been ongoing for the past month.  The patient was initially evaluated by his PCP with normal testicular exam and negative gonorrhea and chlamydia screen, but was treated with a 10 day course of doxycycline for possible urethritis.  Patient had no change in symptoms and subsequently presented to urology and was seen 10/15/2021 via virtual visit.  A urinalysis and urine culture were obtained which were negative for infection, and he was started on empiric antibiotic treatment with ciprofloxacin for treatment of prostatitis.      Since initiating antibiotic therapy, the patient reports some improvement in symptoms, which can wax and wane.  He states symptoms went away for a while but then are continuing to recur, though they are more mild that prior to starting the cipro.  He states the perineal pain is more mild and he is able to sit more comfortably than he was before, and he continues to notice some mild dysuria.           Past Medical History:     Past Medical History:   Diagnosis Date     Anxiety      Hoarseness     Off and on over the years.     Seasonal allergies             Past Surgical History:   No past surgical history on file.         Medications     Current Outpatient Medications   Medication     atomoxetine (STRATTERA) 25 MG capsule     ciprofloxacin (CIPRO) 500 MG tablet     multivitamin, therapeutic (THERA-VIT) TABS tablet     naproxen (NAPROSYN) 250 MG tablet     No current facility-administered medications for this visit.            Family History:     Family History   Problem Relation Age of Onset     Coronary Artery Disease Mother      Hypertension Mother      Hyperlipidemia Mother      Cerebrovascular Disease Mother      Migraines Mother      Colon Cancer Maternal Grandmother      Other Cancer Paternal Grandmother         ? type      Diabetes Paternal Grandmother      Gastrointestinal Disease Maternal Grandfather         diverticulitis            Social History:     Social History     Socioeconomic History     Marital status:      Spouse name: Not on file     Number of children: Not on file     Years of education: Not on file     Highest education level: Not on file   Occupational History     Not on file   Tobacco Use     Smoking status: Never Smoker     Smokeless tobacco: Never Used   Substance and Sexual Activity     Alcohol use: Yes     Alcohol/week: 4.0 standard drinks     Types: 4 Standard drinks or equivalent per week     Comment: 4 a week average      Drug use: No     Sexual activity: Yes     Partners: Female   Other Topics Concern      Service No     Blood Transfusions No     Caffeine Concern No     Comment: 2cpd     Occupational Exposure No     Hobby Hazards No     Sleep Concern No     Stress Concern No     Weight Concern No     Special Diet No     Back Care No     Exercise Yes     Comment: 4 per week crossfit and yoga     Bike Helmet Yes     Comment: NA     Seat Belt Yes     Self-Exams Yes     Parent/sibling w/ CABG, MI or angioplasty before 65F 55M? Not Asked   Social History Narrative    Lives with fiance.  Works going well.  Works in [employer].  Feels safe in environment.  Has a good support network.  Moved from [place] a year ago.    Chelsi Ruiz PA-C    11/10/2015     Social Determinants of Health     Financial Resource Strain:      Difficulty of Paying Living Expenses:    Food Insecurity:      Worried About Running Out of Food in the Last Year:      Ran Out of Food in the Last Year:    Transportation Needs:      Lack of Transportation (Medical):      Lack of Transportation (Non-Medical):    Physical Activity:      Days of Exercise per Week:      Minutes of Exercise per Session:    Stress:      Feeling of Stress :    Social Connections:      Frequency of Communication with Friends and Family:      Frequency of  "Social Gatherings with Friends and Family:      Attends [employer] Services:      Active Member of Clubs or Organizations:      Attends Club or Organization Meetings:      Marital Status:    Intimate Partner Violence:      Fear of Current or Ex-Partner:      Emotionally Abused:      Physically Abused:      Sexually Abused:             Allergies:   Sulfa drugs         Review of Systems:  From intake questionnaire   Negative 14 system review except as noted on HPI, nurse's note.         Physical Exam:   Patient is a 36 year old  male   Vitals: Blood pressure 123/76, pulse 73, height 1.88 m (6' 2\"), weight 97.5 kg (215 lb).  General Appearance Adult: Alert, no acute distress, oriented  Lungs: no respiratory distress, or pursed lip breathing  Heart: No obvious jugular venous distension present  Musculoskeltal: extremities normal, no peripheral edema  Skin: no suspicious lesions or rashes  Neuro: Alert, oriented, speech and mentation normal  : NIXON anodular, symmetric, no enlargement, non-tender, not boggy      Labs and Pathology:    I personally reviewed all applicable laboratory data and went over findings with patient  Significant for:    CBC RESULTS:  Recent Labs   Lab Test 07/24/17  1220   HGB 14.0       UA RESULTS:   Recent Labs   Lab Test 10/15/21  1219 07/08/16  0845 11/10/15  0848   SG 1.010 1.004 <=1.005   URINEPH 6.0 7.0 6.0   NITRITE Negative Negative Negative   RBCU 0-2 0  --    WBCU 0-5 1  --          Imaging:    I personally reviewed all applicable imaging and went over findings with patient.  Significant for:    Results for orders placed or performed during the hospital encounter of 12/17/15   US Scrotum and Testicles    Narrative    EXAMINATION: US TESTICULAR AND SCROTUM  12/17/2015 8:02 AM      CLINICAL HISTORY: Occasional left shoulder pain for a year      COMPARISON: None.        PROCEDURE COMMENTS: Ultrasound of the scrotum was performed with color  and spectral Doppler.    FINDINGS:  Right testis: " 5.1 x 2.8 x 3.3 cm, volume of 24.6 mL.  Left testis: 5.0 x 2.8 x 2.9 cm, volume of 21.2 mL.    The testes are normal in size, shape, and echotexture, and are located  within the scrotum. Few scattered left testicular microlithiasis.     Prominent right epididymal cyst 1.3 x 0.8 x 1.1 cm. Two small left  epididymal cysts.There is no hydrocele or varicocele.    There is normal testicular blood flow as documented by both color  Doppler evaluation and spectral Doppler waveforms.  There is no  evidence of testicular torsion.      Impression    IMPRESSION: Unremarkable scrotal ultrasound. Few scattered left  testicular microlithiasis.     I have personally reviewed the examination and initial interpretation  and I agree with the findings.    KRIS VILLAGRAN MD            Assessment and Plan:     Assessment: 36 year old male with a history of perineal pain and dysuria, currently on ciprofloxacin for empiric treatment of prostatitis.  Patient reporting some mild improvement in symptoms since initiating antibiotic therapy, though the perineal pain and dysuria are continuing to be present but at a more mild severity.  Physical exam today reassuring with NIXON revealing a non-tender and non-boggy prostate.  Discussed with patient continuing with antibiotic treatment with the ciprofloxacin for another week, given symptoms are slowly improving.  If after two full weeks of antibiotic treatment and symptoms are continuing to persist with no further improvement, will plan to discontinue the ciprofloxacin and refer for pelvic floor physical therapy for possible pelvic floor muscle dysfunction.  If symptoms are continuing to improve on the ciprofloxacin, will plan to complete the full 28 day course treatment for prostatitis.  Will hold off on repeat UA/UC today given current antibiotic therapy, which would likely yield negative results.      Plan:  - Continue ciprofloxacin through this week for a total of 2 weeks of treatment.    - If  symptoms continue to improve, we will plan to complete the full month course of ciprofloxacin for treatment of prostatitis.    - If symptoms are still not getting any better, we will plan to stop the ciprofloxacin and will refer to pelvic floor physical therapy for treatment of pelvic muscle dysfunction.       VICK Prater  Department of Urology

## 2021-10-25 NOTE — PATIENT INSTRUCTIONS
UROLOGY CLINIC VISIT PATIENT INSTRUCTIONS    - Continue your ciprofloxacin through this week for a total of 2 weeks of treatment.    - If symptoms continue to improve, we will plan to complete the full month course of antibiotic.    - If symptoms are still not getting any better, we will plan to stop the ciprofloxacin and will refer to pelvic floor physical therapy for treatment of pelvic muscle dysfunction.     If you have any issues, questions or concerns in the meantime, do not hesitate to contact us at [phone] or via [employer].     It was a pleasure meeting with you today.  Thank you for allowing me and my team the privilege of caring for you today.  YOU are the reason we are here, and I truly hope we provided you with the excellent service you deserve.  Please let us know if there is anything else we can do for you so that we can be sure you are leaving completely satisfied with your care experience.    Yola Anderson PA-C  Department of Urology

## 2021-10-25 NOTE — LETTER
10/25/2021       RE: Donte Mixon  [address]  [facility] [address]     Dear Colleague,    Thank you for referring your patient, Donte Mixon, to the [facility] [place] [facility]. Please see a copy of my visit note below.          Chief Complaint:   Follow up          History of Present Illness:   Donte Mixon is a 36 year old male with a history of perineal and left testicular pain which has been ongoing for the past month.  The patient was initially evaluated by his PCP with normal testicular exam and negative gonorrhea and chlamydia screen, but was treated with a 10 day course of doxycycline for possible urethritis.  Patient had no change in symptoms and subsequently presented to urology and was seen 10/15/2021 via virtual visit.  A urinalysis and urine culture were obtained which were negative for infection, and he was started on empiric antibiotic treatment with ciprofloxacin for treatment of prostatitis.      Since initiating antibiotic therapy, the patient reports some improvement in symptoms, which can wax and wane.  He states symptoms went away for a while but then are continuing to recur, though they are more mild that prior to starting the cipro.  He states the perineal pain is more mild and he is able to sit more comfortably than he was before, and he continues to notice some mild dysuria.           Past Medical History:     Past Medical History:   Diagnosis Date     Anxiety      Hoarseness     Off and on over the years.     Seasonal allergies             Past Surgical History:   No past surgical history on file.         Medications     Current Outpatient Medications   Medication     atomoxetine (STRATTERA) 25 MG capsule     ciprofloxacin (CIPRO) 500 MG tablet     multivitamin, therapeutic (THERA-VIT) TABS tablet     naproxen (NAPROSYN) 250 MG tablet     No current facility-administered medications for this visit.             Family History:     Family History   Problem Relation Age of Onset     Coronary Artery Disease Mother      Hypertension Mother      Hyperlipidemia Mother      Cerebrovascular Disease Mother      Migraines Mother      Colon Cancer Maternal Grandmother      Other Cancer Paternal Grandmother         ? type     Diabetes Paternal Grandmother      Gastrointestinal Disease Maternal Grandfather         diverticulitis            Social History:     Social History     Socioeconomic History     Marital status:      Spouse name: Not on file     Number of children: Not on file     Years of education: Not on file     Highest education level: Not on file   Occupational History     Not on file   Tobacco Use     Smoking status: Never Smoker     Smokeless tobacco: Never Used   Substance and Sexual Activity     Alcohol use: Yes     Alcohol/week: 4.0 standard drinks     Types: 4 Standard drinks or equivalent per week     Comment: 4 a week average      Drug use: No     Sexual activity: Yes     Partners: Female   Other Topics Concern      Service No     Blood Transfusions No     Caffeine Concern No     Comment: 2cpd     Occupational Exposure No     Hobby Hazards No     Sleep Concern No     Stress Concern No     Weight Concern No     Special Diet No     Back Care No     Exercise Yes     Comment: 4 per week crossfit and yoga     Bike Helmet Yes     Comment: NA     Seat Belt Yes     Self-Exams Yes     Parent/sibling w/ CABG, MI or angioplasty before 65F 55M? Not Asked   Social History Narrative    Lives with fiance.  Works going well.  Works in insurance.  Feels safe in environment.  Has a good support network.  Moved from [place] a year ago.    Chelsi Ruiz PA-C    11/10/2015     Social Determinants of Health     Financial Resource Strain:      Difficulty of Paying Living Expenses:    Food Insecurity:      Worried About Running Out of Food in the Last Year:      Ran Out of Food in the Last Year:    Transportation Needs:   "    Lack of Transportation (Medical):      Lack of Transportation (Non-Medical):    Physical Activity:      Days of Exercise per Week:      Minutes of Exercise per Session:    Stress:      Feeling of Stress :    Social Connections:      Frequency of Communication with Friends and Family:      Frequency of Social Gatherings with Friends and Family:      Attends [employer] Services:      Active Member of Clubs or Organizations:      Attends Club or Organization Meetings:      Marital Status:    Intimate Partner Violence:      Fear of Current or Ex-Partner:      Emotionally Abused:      Physically Abused:      Sexually Abused:             Allergies:   Sulfa drugs         Review of Systems:  From intake questionnaire   Negative 14 system review except as noted on HPI, nurse's note.         Physical Exam:   Patient is a 36 year old  male   Vitals: Blood pressure 123/76, pulse 73, height 1.88 m (6' 2\"), weight 97.5 kg (215 lb).  General Appearance Adult: Alert, no acute distress, oriented  Lungs: no respiratory distress, or pursed lip breathing  Heart: No obvious jugular venous distension present  Musculoskeltal: extremities normal, no peripheral edema  Skin: no suspicious lesions or rashes  Neuro: Alert, oriented, speech and mentation normal  : NIXON anodular, symmetric, no enlargement, non-tender, not boggy      Labs and Pathology:    I personally reviewed all applicable laboratory data and went over findings with patient  Significant for:    CBC RESULTS:  Recent Labs   Lab Test 07/24/17  1220   HGB 14.0       UA RESULTS:   Recent Labs   Lab Test 10/15/21  1219 07/08/16  0845 11/10/15  0848   SG 1.010 1.004 <=1.005   URINEPH 6.0 7.0 6.0   NITRITE Negative Negative Negative   RBCU 0-2 0  --    WBCU 0-5 1  --          Imaging:    I personally reviewed all applicable imaging and went over findings with patient.  Significant for:    Results for orders placed or performed during the hospital encounter of 12/17/15   US Scrotum " and Testicles    Narrative    EXAMINATION: US TESTICULAR AND SCROTUM  12/17/2015 8:02 AM      CLINICAL HISTORY: Occasional left shoulder pain for a year      COMPARISON: None.        PROCEDURE COMMENTS: Ultrasound of the scrotum was performed with color  and spectral Doppler.    FINDINGS:  Right testis: 5.1 x 2.8 x 3.3 cm, volume of 24.6 mL.  Left testis: 5.0 x 2.8 x 2.9 cm, volume of 21.2 mL.    The testes are normal in size, shape, and echotexture, and are located  within the scrotum. Few scattered left testicular microlithiasis.     Prominent right epididymal cyst 1.3 x 0.8 x 1.1 cm. Two small left  epididymal cysts.There is no hydrocele or varicocele.    There is normal testicular blood flow as documented by both color  Doppler evaluation and spectral Doppler waveforms.  There is no  evidence of testicular torsion.      Impression    IMPRESSION: Unremarkable scrotal ultrasound. Few scattered left  testicular microlithiasis.     I have personally reviewed the examination and initial interpretation  and I agree with the findings.    KRIS VILLAGRAN MD            Assessment and Plan:     Assessment: 36 year old male with a history of perineal pain and dysuria, currently on ciprofloxacin for empiric treatment of prostatitis.  Patient reporting some mild improvement in symptoms since initiating antibiotic therapy, though the perineal pain and dysuria are continuing to be present but at a more mild severity.  Physical exam today reassuring with NIXON revealing a non-tender and non-boggy prostate.  Discussed with patient continuing with antibiotic treatment with the ciprofloxacin for another week, given symptoms are slowly improving.  If after two full weeks of antibiotic treatment and symptoms are continuing to persist with no further improvement, will plan to discontinue the ciprofloxacin and refer for pelvic floor physical therapy for possible pelvic floor muscle dysfunction.  If symptoms are continuing to improve on the  ciprofloxacin, will plan to complete the full 28 day course treatment for prostatitis.  Will hold off on repeat UA/UC today given current antibiotic therapy, which would likely yield negative results.      Plan:  - Continue ciprofloxacin through this week for a total of 2 weeks of treatment.    - If symptoms continue to improve, we will plan to complete the full month course of ciprofloxacin for treatment of prostatitis.    - If symptoms are still not getting any better, we will plan to stop the ciprofloxacin and will refer to pelvic floor physical therapy for treatment of pelvic muscle dysfunction.       VICK Prater  Department of Urology

## 2021-10-25 NOTE — NURSING NOTE
"Chief Complaint   Patient presents with     Follow Up     testicular pain       Blood pressure 123/76, pulse 73, height 1.88 m (6' 2\"), weight 97.5 kg (215 lb). Body mass index is 27.6 kg/m .    Patient Active Problem List   Diagnosis     Routine general medical examination at a health care facility     Testicular pain     Hemorrhoids, unspecified hemorrhoid type     Deviated nasal septum     Nasal obstruction     Hypertrophy of inferior nasal turbinate       Allergies   Allergen Reactions     Sulfa Drugs Hives       Current Outpatient Medications   Medication Sig Dispense Refill     atomoxetine (STRATTERA) 25 MG capsule        ciprofloxacin (CIPRO) 500 MG tablet Take 1 tablet (500 mg) by mouth 2 times daily for 28 days 56 tablet 0     multivitamin, therapeutic (THERA-VIT) TABS tablet Take 1 tablet by mouth daily       naproxen (NAPROSYN) 250 MG tablet Take by mouth 2 times daily (with meals)         Social History     Tobacco Use     Smoking status: Never Smoker     Smokeless tobacco: Never Used   Substance Use Topics     Alcohol use: Yes     Alcohol/week: 4.0 standard drinks     Types: 4 Standard drinks or equivalent per week     Comment: 4 a week average      Drug use: No       Carmen Javier  10/25/2021  8:14 AM  "

## 2022-05-15 ENCOUNTER — HEALTH MAINTENANCE LETTER (OUTPATIENT)
Age: 37
End: 2022-05-15

## 2022-09-11 ENCOUNTER — HEALTH MAINTENANCE LETTER (OUTPATIENT)
Age: 37
End: 2022-09-11

## 2023-01-22 ENCOUNTER — HEALTH MAINTENANCE LETTER (OUTPATIENT)
Age: 38
End: 2023-01-22

## 2024-02-24 ENCOUNTER — HEALTH MAINTENANCE LETTER (OUTPATIENT)
Age: 39
End: 2024-02-24

## 2025-01-09 NOTE — TELEPHONE ENCOUNTER
Was on antibiotic and prednisone for left ear/ finished the prednisone but not the antibiotic/ and he did start back on the antibiotic now/ ear does not bother/no fever but he has sinus congestion/ will try home care and call back for an appointment in 2-3 days if he needs it.  Wiliam Alonso RN -[phone]  Additional Information    Negative: Severe difficulty breathing (e.g., struggling for each breath, speaks in single words)    Negative: Sounds like a life-threatening emergency to the triager    Negative: [1] Sinus infection AND [2] taking an antibiotic AND [3] symptoms continue    Negative: [1] Difficulty breathing AND [2] not from stuffy nose (e.g., not relieved by cleaning out the nose)    Negative: [1] SEVERE headache AND [2] fever    Negative: [1] Redness or swelling on the cheek, forehead or around the eye AND [2] fever    Negative: Fever > 104 F (40 C)    Negative: Patient sounds very sick or weak to the triager    Negative: [1] SEVERE pain AND [2] not improved 2 hours after pain medicine    Negative: [1] Redness or swelling on the cheek, forehead or around the eye AND [2] no fever    Negative: [1] Fever > 101 F (38.3 C) AND [2] age > 60    Negative: [1] Fever > 101 F (38.3 C) AND [2] bedridden (e.g., nursing home patient, CVA, chronic illness, recovering from surgery)    Negative: [1] Fever > 100.5 F (38.1 C) AND [2] diabetes mellitus or weak immune system (e.g., HIV positive, cancer chemo, splenectomy, organ transplant, chronic steroids)    Negative: Fever present > 3 days (72 hours)    Negative: [1] Fever returns after gone for over 24 hours AND [2] symptoms worse or not improved    Negative: [1] Sinus pain (not just congestion) AND [2] fever    Negative: Earache    Negative: [1] Sinus congestion (pressure, fullness) AND [2] present > 10 days    Negative: [1] Nasal discharge AND [2] present > 10 days    Negative: [1] Using nasal washes and pain medicine > 24 hours AND [2] sinus pain (around  cheekbone or eye) persists    Negative: Lots of coughing    [1] Sinus congestion as part of a cold AND [2] present < 10 days (all triage questions negative)    Protocols used: SINUS PAIN OR CONGESTION-ADULT-AH     There is 1 Wet Read(s) to document. There are no Wet Read(s) to document.

## (undated) RX ORDER — FENTANYL CITRATE 50 UG/ML
INJECTION, SOLUTION INTRAMUSCULAR; INTRAVENOUS
Status: DISPENSED
Start: 2019-02-07